# Patient Record
Sex: FEMALE | Race: WHITE | NOT HISPANIC OR LATINO | Employment: FULL TIME | URBAN - NONMETROPOLITAN AREA
[De-identification: names, ages, dates, MRNs, and addresses within clinical notes are randomized per-mention and may not be internally consistent; named-entity substitution may affect disease eponyms.]

---

## 2022-07-29 ENCOUNTER — HOSPITAL ENCOUNTER (INPATIENT)
Facility: HOSPITAL | Age: 22
LOS: 4 days | Discharge: HOME | End: 2022-08-02
Attending: PSYCHIATRY & NEUROLOGY | Admitting: PSYCHIATRY & NEUROLOGY

## 2022-07-29 ENCOUNTER — HOSPITAL ENCOUNTER (EMERGENCY)
Facility: HOSPITAL | Age: 22
Discharge: STILL ADMITTED OR ADMINISTRATIVE | End: 2022-07-29
Attending: EMERGENCY MEDICINE

## 2022-07-29 VITALS
DIASTOLIC BLOOD PRESSURE: 82 MMHG | HEART RATE: 71 BPM | SYSTOLIC BLOOD PRESSURE: 120 MMHG | TEMPERATURE: 98.2 F | WEIGHT: 170 LBS | OXYGEN SATURATION: 96 % | BODY MASS INDEX: 26.68 KG/M2 | HEIGHT: 67 IN | RESPIRATION RATE: 17 BRPM

## 2022-07-29 DIAGNOSIS — F32.A DEPRESSION WITH SUICIDAL IDEATION: Primary | ICD-10-CM

## 2022-07-29 DIAGNOSIS — R45.851 DEPRESSION WITH SUICIDAL IDEATION: Primary | ICD-10-CM

## 2022-07-29 LAB
ALBUMIN SERPL-MCNC: 5.11 G/DL (ref 3.5–5.2)
ALBUMIN/GLOB SERPL: 2.3 G/DL
ALP SERPL-CCNC: 74 U/L (ref 39–117)
ALT SERPL W P-5'-P-CCNC: 19 U/L (ref 1–33)
AMPHET+METHAMPHET UR QL: NEGATIVE
AMPHETAMINES UR QL: NEGATIVE
ANION GAP SERPL CALCULATED.3IONS-SCNC: 14.8 MMOL/L (ref 5–15)
AST SERPL-CCNC: 12 U/L (ref 1–32)
B-HCG UR QL: NEGATIVE
BACTERIA UR QL AUTO: ABNORMAL /HPF
BARBITURATES UR QL SCN: NEGATIVE
BASOPHILS # BLD AUTO: 0.06 10*3/MM3 (ref 0–0.2)
BASOPHILS NFR BLD AUTO: 0.7 % (ref 0–1.5)
BENZODIAZ UR QL SCN: POSITIVE
BILIRUB SERPL-MCNC: 0.4 MG/DL (ref 0–1.2)
BILIRUB UR QL STRIP: NEGATIVE
BUN SERPL-MCNC: 6 MG/DL (ref 6–20)
BUN/CREAT SERPL: 6.7 (ref 7–25)
BUPRENORPHINE SERPL-MCNC: NEGATIVE NG/ML
CALCIUM SPEC-SCNC: 10 MG/DL (ref 8.6–10.5)
CANNABINOIDS SERPL QL: NEGATIVE
CHLORIDE SERPL-SCNC: 106 MMOL/L (ref 98–107)
CLARITY UR: ABNORMAL
CO2 SERPL-SCNC: 22.2 MMOL/L (ref 22–29)
COCAINE UR QL: NEGATIVE
COLOR UR: ABNORMAL
CREAT SERPL-MCNC: 0.9 MG/DL (ref 0.57–1)
DEPRECATED RDW RBC AUTO: 43.2 FL (ref 37–54)
EGFRCR SERPLBLD CKD-EPI 2021: 91.7 ML/MIN/1.73
EOSINOPHIL # BLD AUTO: 0.08 10*3/MM3 (ref 0–0.4)
EOSINOPHIL NFR BLD AUTO: 0.9 % (ref 0.3–6.2)
ERYTHROCYTE [DISTWIDTH] IN BLOOD BY AUTOMATED COUNT: 13.7 % (ref 12.3–15.4)
ETHANOL BLD-MCNC: <10 MG/DL (ref 0–10)
ETHANOL UR QL: <0.01 %
FLUAV SUBTYP SPEC NAA+PROBE: NOT DETECTED
FLUBV RNA ISLT QL NAA+PROBE: NOT DETECTED
GLOBULIN UR ELPH-MCNC: 2.2 GM/DL
GLUCOSE SERPL-MCNC: 116 MG/DL (ref 65–99)
GLUCOSE UR STRIP-MCNC: NEGATIVE MG/DL
HCT VFR BLD AUTO: 40.8 % (ref 34–46.6)
HGB BLD-MCNC: 13.6 G/DL (ref 12–15.9)
HGB UR QL STRIP.AUTO: ABNORMAL
HOLD SPECIMEN: NORMAL
HOLD SPECIMEN: NORMAL
HYALINE CASTS UR QL AUTO: ABNORMAL /LPF
IMM GRANULOCYTES # BLD AUTO: 0.03 10*3/MM3 (ref 0–0.05)
IMM GRANULOCYTES NFR BLD AUTO: 0.3 % (ref 0–0.5)
KETONES UR QL STRIP: ABNORMAL
LEUKOCYTE ESTERASE UR QL STRIP.AUTO: ABNORMAL
LYMPHOCYTES # BLD AUTO: 1.76 10*3/MM3 (ref 0.7–3.1)
LYMPHOCYTES NFR BLD AUTO: 20.5 % (ref 19.6–45.3)
MAGNESIUM SERPL-MCNC: 2.1 MG/DL (ref 1.6–2.6)
MCH RBC QN AUTO: 28.7 PG (ref 26.6–33)
MCHC RBC AUTO-ENTMCNC: 33.3 G/DL (ref 31.5–35.7)
MCV RBC AUTO: 86.1 FL (ref 79–97)
METHADONE UR QL SCN: NEGATIVE
MONOCYTES # BLD AUTO: 0.29 10*3/MM3 (ref 0.1–0.9)
MONOCYTES NFR BLD AUTO: 3.4 % (ref 5–12)
NEUTROPHILS NFR BLD AUTO: 6.37 10*3/MM3 (ref 1.7–7)
NEUTROPHILS NFR BLD AUTO: 74.2 % (ref 42.7–76)
NITRITE UR QL STRIP: NEGATIVE
NRBC BLD AUTO-RTO: 0 /100 WBC (ref 0–0.2)
OPIATES UR QL: NEGATIVE
OXYCODONE UR QL SCN: NEGATIVE
PCP UR QL SCN: NEGATIVE
PH UR STRIP.AUTO: 6 [PH] (ref 5–8)
PLATELET # BLD AUTO: 255 10*3/MM3 (ref 140–450)
PMV BLD AUTO: 9.7 FL (ref 6–12)
POTASSIUM SERPL-SCNC: 3.6 MMOL/L (ref 3.5–5.2)
PROPOXYPH UR QL: NEGATIVE
PROT SERPL-MCNC: 7.3 G/DL (ref 6–8.5)
PROT UR QL STRIP: ABNORMAL
RBC # BLD AUTO: 4.74 10*6/MM3 (ref 3.77–5.28)
RBC # UR STRIP: ABNORMAL /HPF
REF LAB TEST METHOD: ABNORMAL
SARS-COV-2 RNA PNL SPEC NAA+PROBE: NOT DETECTED
SODIUM SERPL-SCNC: 143 MMOL/L (ref 136–145)
SP GR UR STRIP: 1.02 (ref 1–1.03)
SQUAMOUS #/AREA URNS HPF: ABNORMAL /HPF
TRICYCLICS UR QL SCN: NEGATIVE
UROBILINOGEN UR QL STRIP: ABNORMAL
WBC # UR STRIP: ABNORMAL /HPF
WBC NRBC COR # BLD: 8.59 10*3/MM3 (ref 3.4–10.8)
WHOLE BLOOD HOLD COAG: NORMAL
WHOLE BLOOD HOLD SPECIMEN: NORMAL

## 2022-07-29 PROCEDURE — 87636 SARSCOV2 & INF A&B AMP PRB: CPT | Performed by: PHYSICIAN ASSISTANT

## 2022-07-29 PROCEDURE — 80053 COMPREHEN METABOLIC PANEL: CPT | Performed by: PHYSICIAN ASSISTANT

## 2022-07-29 PROCEDURE — 99285 EMERGENCY DEPT VISIT HI MDM: CPT

## 2022-07-29 PROCEDURE — 93005 ELECTROCARDIOGRAM TRACING: CPT | Performed by: PSYCHIATRY & NEUROLOGY

## 2022-07-29 PROCEDURE — 83735 ASSAY OF MAGNESIUM: CPT | Performed by: PHYSICIAN ASSISTANT

## 2022-07-29 PROCEDURE — 82077 ASSAY SPEC XCP UR&BREATH IA: CPT | Performed by: PHYSICIAN ASSISTANT

## 2022-07-29 PROCEDURE — 80306 DRUG TEST PRSMV INSTRMNT: CPT | Performed by: PHYSICIAN ASSISTANT

## 2022-07-29 PROCEDURE — 81025 URINE PREGNANCY TEST: CPT | Performed by: PHYSICIAN ASSISTANT

## 2022-07-29 PROCEDURE — 85025 COMPLETE CBC W/AUTO DIFF WBC: CPT | Performed by: PHYSICIAN ASSISTANT

## 2022-07-29 PROCEDURE — 81001 URINALYSIS AUTO W/SCOPE: CPT | Performed by: PHYSICIAN ASSISTANT

## 2022-07-29 RX ORDER — ECHINACEA PURPUREA EXTRACT 125 MG
2 TABLET ORAL AS NEEDED
Status: DISCONTINUED | OUTPATIENT
Start: 2022-07-29 | End: 2022-08-02 | Stop reason: HOSPADM

## 2022-07-29 RX ORDER — LOPERAMIDE HYDROCHLORIDE 2 MG/1
2 CAPSULE ORAL
Status: DISCONTINUED | OUTPATIENT
Start: 2022-07-29 | End: 2022-08-02 | Stop reason: HOSPADM

## 2022-07-29 RX ORDER — BENZONATATE 100 MG/1
100 CAPSULE ORAL 3 TIMES DAILY PRN
Status: DISCONTINUED | OUTPATIENT
Start: 2022-07-29 | End: 2022-08-02 | Stop reason: HOSPADM

## 2022-07-29 RX ORDER — ARIPIPRAZOLE 5 MG/1
5 TABLET ORAL DAILY
COMMUNITY
End: 2022-08-02 | Stop reason: HOSPADM

## 2022-07-29 RX ORDER — ALUMINA, MAGNESIA, AND SIMETHICONE 2400; 2400; 240 MG/30ML; MG/30ML; MG/30ML
15 SUSPENSION ORAL EVERY 6 HOURS PRN
Status: DISCONTINUED | OUTPATIENT
Start: 2022-07-29 | End: 2022-08-02 | Stop reason: HOSPADM

## 2022-07-29 RX ORDER — HYDROXYZINE 50 MG/1
50 TABLET, FILM COATED ORAL EVERY 6 HOURS PRN
Status: DISCONTINUED | OUTPATIENT
Start: 2022-07-29 | End: 2022-08-02 | Stop reason: HOSPADM

## 2022-07-29 RX ORDER — TRAZODONE HYDROCHLORIDE 50 MG/1
50 TABLET ORAL NIGHTLY PRN
Status: DISCONTINUED | OUTPATIENT
Start: 2022-07-29 | End: 2022-08-02 | Stop reason: HOSPADM

## 2022-07-29 RX ORDER — BENZTROPINE MESYLATE 1 MG/ML
1 INJECTION INTRAMUSCULAR; INTRAVENOUS ONCE AS NEEDED
Status: DISCONTINUED | OUTPATIENT
Start: 2022-07-29 | End: 2022-08-02 | Stop reason: HOSPADM

## 2022-07-29 RX ORDER — FAMOTIDINE 20 MG/1
20 TABLET, FILM COATED ORAL 2 TIMES DAILY PRN
Status: DISCONTINUED | OUTPATIENT
Start: 2022-07-29 | End: 2022-08-02 | Stop reason: HOSPADM

## 2022-07-29 RX ORDER — CETIRIZINE HYDROCHLORIDE 10 MG/1
10 TABLET ORAL DAILY
COMMUNITY

## 2022-07-29 RX ORDER — BENZTROPINE MESYLATE 1 MG/1
2 TABLET ORAL ONCE AS NEEDED
Status: DISCONTINUED | OUTPATIENT
Start: 2022-07-29 | End: 2022-08-02 | Stop reason: HOSPADM

## 2022-07-29 RX ORDER — LORAZEPAM 0.5 MG/1
0.5 TABLET ORAL 2 TIMES DAILY
Status: CANCELLED | OUTPATIENT
Start: 2022-07-29

## 2022-07-29 RX ORDER — LORAZEPAM 0.5 MG/1
0.5 TABLET ORAL 2 TIMES DAILY
Status: DISCONTINUED | OUTPATIENT
Start: 2022-07-29 | End: 2022-07-31

## 2022-07-29 RX ORDER — ARIPIPRAZOLE 10 MG/1
5 TABLET ORAL DAILY
Status: DISCONTINUED | OUTPATIENT
Start: 2022-07-30 | End: 2022-07-30

## 2022-07-29 RX ORDER — ONDANSETRON 4 MG/1
4 TABLET, FILM COATED ORAL EVERY 6 HOURS PRN
Status: DISCONTINUED | OUTPATIENT
Start: 2022-07-29 | End: 2022-08-02 | Stop reason: HOSPADM

## 2022-07-29 RX ORDER — IBUPROFEN 400 MG/1
400 TABLET ORAL EVERY 6 HOURS PRN
Status: DISCONTINUED | OUTPATIENT
Start: 2022-07-29 | End: 2022-08-02 | Stop reason: HOSPADM

## 2022-07-29 RX ORDER — ACETAMINOPHEN 325 MG/1
650 TABLET ORAL EVERY 6 HOURS PRN
Status: DISCONTINUED | OUTPATIENT
Start: 2022-07-29 | End: 2022-08-02 | Stop reason: HOSPADM

## 2022-07-29 RX ORDER — CETIRIZINE HYDROCHLORIDE 10 MG/1
10 TABLET ORAL DAILY
Status: DISCONTINUED | OUTPATIENT
Start: 2022-07-30 | End: 2022-08-02 | Stop reason: HOSPADM

## 2022-07-29 RX ORDER — OLANZAPINE 5 MG/1
5 TABLET ORAL EVERY 6 HOURS PRN
Status: DISCONTINUED | OUTPATIENT
Start: 2022-07-29 | End: 2022-08-02 | Stop reason: HOSPADM

## 2022-07-29 RX ORDER — LORAZEPAM 0.5 MG/1
0.5 TABLET ORAL 2 TIMES DAILY
COMMUNITY
End: 2022-08-02 | Stop reason: HOSPADM

## 2022-07-29 RX ADMIN — ACETAMINOPHEN 650 MG: 325 TABLET ORAL at 21:42

## 2022-07-29 RX ADMIN — LORAZEPAM 0.5 MG: 0.5 TABLET ORAL at 21:42

## 2022-07-29 RX ADMIN — TRAZODONE HYDROCHLORIDE 50 MG: 50 TABLET ORAL at 22:54

## 2022-07-30 PROBLEM — F33.2 SEVERE EPISODE OF RECURRENT MAJOR DEPRESSIVE DISORDER, WITHOUT PSYCHOTIC FEATURES: Status: ACTIVE | Noted: 2022-07-30

## 2022-07-30 LAB
QT INTERVAL: 362 MS
QTC INTERVAL: 442 MS

## 2022-07-30 PROCEDURE — 99221 1ST HOSP IP/OBS SF/LOW 40: CPT | Performed by: PSYCHIATRY & NEUROLOGY

## 2022-07-30 RX ORDER — RISPERIDONE 1 MG/1
1 TABLET ORAL EVERY 12 HOURS SCHEDULED
Status: DISCONTINUED | OUTPATIENT
Start: 2022-07-30 | End: 2022-07-31

## 2022-07-30 RX ORDER — RISPERIDONE 0.25 MG/1
0.5 TABLET ORAL EVERY 12 HOURS SCHEDULED
Status: DISCONTINUED | OUTPATIENT
Start: 2022-07-30 | End: 2022-07-30

## 2022-07-30 RX ADMIN — OLANZAPINE 5 MG: 5 TABLET, FILM COATED ORAL at 08:09

## 2022-07-30 RX ADMIN — LORAZEPAM 0.5 MG: 0.5 TABLET ORAL at 08:07

## 2022-07-30 RX ADMIN — TRAZODONE HYDROCHLORIDE 50 MG: 50 TABLET ORAL at 21:13

## 2022-07-30 RX ADMIN — ACETAMINOPHEN 650 MG: 325 TABLET ORAL at 21:53

## 2022-07-30 RX ADMIN — CETIRIZINE HYDROCHLORIDE 10 MG: 10 TABLET, FILM COATED ORAL at 08:07

## 2022-07-30 RX ADMIN — HYDROXYZINE HYDROCHLORIDE 50 MG: 50 TABLET ORAL at 02:18

## 2022-07-30 RX ADMIN — OLANZAPINE 5 MG: 5 TABLET, FILM COATED ORAL at 13:49

## 2022-07-30 RX ADMIN — LORAZEPAM 0.5 MG: 0.5 TABLET ORAL at 20:25

## 2022-07-30 RX ADMIN — RISPERIDONE 0.5 MG: 0.25 TABLET, FILM COATED ORAL at 11:14

## 2022-07-30 RX ADMIN — RISPERIDONE 1 MG: 1 TABLET, FILM COATED ORAL at 20:25

## 2022-07-31 PROCEDURE — 99231 SBSQ HOSP IP/OBS SF/LOW 25: CPT | Performed by: PSYCHIATRY & NEUROLOGY

## 2022-07-31 RX ORDER — RISPERIDONE 1 MG/1
2 TABLET ORAL EVERY 12 HOURS SCHEDULED
Status: DISCONTINUED | OUTPATIENT
Start: 2022-07-31 | End: 2022-08-02 | Stop reason: HOSPADM

## 2022-07-31 RX ORDER — LORAZEPAM 0.5 MG/1
0.5 TABLET ORAL NIGHTLY
Status: DISCONTINUED | OUTPATIENT
Start: 2022-07-31 | End: 2022-08-01

## 2022-07-31 RX ADMIN — RISPERIDONE 1 MG: 1 TABLET, FILM COATED ORAL at 08:20

## 2022-07-31 RX ADMIN — RISPERIDONE 2 MG: 1 TABLET, FILM COATED ORAL at 20:32

## 2022-07-31 RX ADMIN — CETIRIZINE HYDROCHLORIDE 10 MG: 10 TABLET, FILM COATED ORAL at 08:20

## 2022-07-31 RX ADMIN — OLANZAPINE 5 MG: 5 TABLET, FILM COATED ORAL at 14:35

## 2022-07-31 RX ADMIN — LORAZEPAM 0.5 MG: 0.5 TABLET ORAL at 20:32

## 2022-07-31 RX ADMIN — HYDROXYZINE HYDROCHLORIDE 50 MG: 50 TABLET ORAL at 10:19

## 2022-07-31 RX ADMIN — ACETAMINOPHEN 650 MG: 325 TABLET ORAL at 21:23

## 2022-07-31 RX ADMIN — TRAZODONE HYDROCHLORIDE 50 MG: 50 TABLET ORAL at 20:32

## 2022-07-31 RX ADMIN — LORAZEPAM 0.5 MG: 0.5 TABLET ORAL at 08:19

## 2022-08-01 PROCEDURE — 99232 SBSQ HOSP IP/OBS MODERATE 35: CPT | Performed by: PSYCHIATRY & NEUROLOGY

## 2022-08-01 RX ORDER — ESCITALOPRAM OXALATE 10 MG/1
10 TABLET ORAL DAILY
Status: DISCONTINUED | OUTPATIENT
Start: 2022-08-01 | End: 2022-08-02 | Stop reason: HOSPADM

## 2022-08-01 RX ADMIN — HYDROXYZINE HYDROCHLORIDE 50 MG: 50 TABLET ORAL at 08:22

## 2022-08-01 RX ADMIN — CETIRIZINE HYDROCHLORIDE 10 MG: 10 TABLET, FILM COATED ORAL at 08:20

## 2022-08-01 RX ADMIN — ESCITALOPRAM 10 MG: 10 TABLET, FILM COATED ORAL at 17:11

## 2022-08-01 RX ADMIN — HYDROXYZINE HYDROCHLORIDE 50 MG: 50 TABLET ORAL at 20:24

## 2022-08-01 RX ADMIN — OLANZAPINE 5 MG: 5 TABLET, FILM COATED ORAL at 12:36

## 2022-08-01 RX ADMIN — RISPERIDONE 2 MG: 1 TABLET, FILM COATED ORAL at 09:25

## 2022-08-01 RX ADMIN — RISPERIDONE 2 MG: 1 TABLET, FILM COATED ORAL at 20:24

## 2022-08-01 RX ADMIN — HYDROXYZINE HYDROCHLORIDE 50 MG: 50 TABLET ORAL at 14:21

## 2022-08-01 RX ADMIN — TRAZODONE HYDROCHLORIDE 50 MG: 50 TABLET ORAL at 20:59
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
## 2022-08-01 NOTE — PLAN OF CARE
DATA:      Therapist discussed case with RN and met with patient today to review coping skills, review plan of care, and discuss discharge.    Therapist provided patient with psychoeducation and reading materials on coping skills and grounding techniques for anxiety. Therapist provided patient with journal paper and encouraged her to journal.      Clinical Maneuvering/Intervention:     Therapist assisted patient in processing above session content; acknowledged and normalized patient’s thoughts, feelings, and concerns.  Discussed the therapist/patient relationship and explain the parameters and limitations of relative confidentiality.  Also discussed the importance of active participation, and honesty to the treatment process.  Encouraged the patient to discuss/vent their feelings, frustrations, and fears concerning their ongoing medical issues and validated their feelings.     Allowed patient to freely discuss issues without interruption or judgment. Provided safe, confidential environment to facilitate the development of positive therapeutic relationship and encourage open, honest communication.      Therapist addressed discharge safety planning this date. Assisted patient in identifying risk factors which would indicate the need for higher level of care after discharge;  including thoughts to harm self or others and/or self-harming behavior. Encouraged patient to call 911, or present to the nearest emergency room should any of these events occur. Discussed crisis intervention services and means to access.  Encouraged securing any objects of harm.    Therapist completed integrated summary, treatment plan, and initiated social history this date.  Therapist is strongly encouraging family involvement in treatment.       Encouraged mask wearing, social distancing, and regular hand washing due to COVID19 risk.      ASSESSMENT:      Therapist met 1:1 with patient today. Patient denies suicidal ideation. Patient  "denies homicidal ideation. Patient denies AVH. Patient is cooperative with the assessment. She reports ongoing anxiety. Patient appears labile while meeting with this therapist; however, appears calm when interacting with peers. Patient is focused on medication, reports ativan is the only think that has helped her. Patient reports she has taken 5-6 different psychotropic medications over the past month and none have been helpful. Therapist educated patient on the importance of giving medications an appropriate amount of time to reach maximum effect. Therapist encouraged medication compliance.    Patient is unable to identify specific stressors. Reports she took a CBD gummy 1 month ago and \"everything has been going down hill since.\" Patient reports constant anxiety and frequent panic attacks. Unable to engage in identifying irrational thoughts, states \"I just worry about my kids.\" Patient has a negative attitude toward behavioral interventions for anxiety, stating \"It doesn't work. Even reading a paper that says 'anxiety' on it makes me anxious.\"    There is some concern regarding aftercare plans. Patient appears to want to see multiple providers at different agencies. She reports that she has an upcoming appointment at [facility] for medication management and therapy, an appointment at [facility] for therapy, and is requesting an additional appointment with MARY Tam for medication management.      PLAN:       Patient to remain hospitalized this date.      Treatment team will focus efforts on stabilizing patient's acute symptoms while providing education on healthy coping and crisis management to reduce hospitalizations.   Patient requires daily psychiatrist evaluation and 24/7 nursing supervision to promote patient  safety.     Therapist will offer 1-4 individual sessions, 1 therapy group daily, family education, and appropriate referral.    "

## 2022-08-01 NOTE — PLAN OF CARE
Goal Outcome Evaluation:  Plan of Care Reviewed With: patient  Patient Agreement with Plan of Care: agrees     Progress: improving  Outcome Evaluation: pt. verbalzies slept 6 hours or more rates anxiety 5 rates depression 5 denies s/i denies h/i denies a//v/h pt. tearful @ times verbalzies can't find a medication to helped discussed coping techniques to talk about feelings ,write in jounal try to think posittive thoughts she verbalzies just worries about family interaction noted with peers .

## 2022-08-01 NOTE — PLAN OF CARE
Goal Outcome Evaluation:  Plan of Care Reviewed With: patient  Patient Agreement with Plan of Care: agrees        Outcome Evaluation: Patient reports anxiety at 3 and depression at 2.  Denies SI/HI or AVH.  Patient frequently seeks out staff.  Patient cooperative.

## 2022-08-02 VITALS
BODY MASS INDEX: 26.18 KG/M2 | DIASTOLIC BLOOD PRESSURE: 99 MMHG | RESPIRATION RATE: 18 BRPM | OXYGEN SATURATION: 98 % | TEMPERATURE: 97.6 F | WEIGHT: 166.8 LBS | HEIGHT: 67 IN | HEART RATE: 102 BPM | SYSTOLIC BLOOD PRESSURE: 163 MMHG

## 2022-08-02 PROCEDURE — 99238 HOSP IP/OBS DSCHRG MGMT 30/<: CPT | Performed by: PSYCHIATRY & NEUROLOGY

## 2022-08-02 RX ORDER — ESCITALOPRAM OXALATE 10 MG/1
10 TABLET ORAL DAILY
Qty: 30 TABLET | Refills: 0 | Status: SHIPPED | OUTPATIENT
Start: 2022-08-03 | End: 2022-08-09 | Stop reason: HOSPADM

## 2022-08-02 RX ORDER — RISPERIDONE 2 MG/1
2 TABLET ORAL EVERY 12 HOURS SCHEDULED
Qty: 60 TABLET | Refills: 0 | Status: SHIPPED | OUTPATIENT
Start: 2022-08-02 | End: 2022-08-02 | Stop reason: SDUPTHER

## 2022-08-02 RX ORDER — RISPERIDONE 1 MG/1
1 TABLET ORAL 2 TIMES DAILY
Qty: 60 TABLET | Refills: 0 | Status: SHIPPED | OUTPATIENT
Start: 2022-08-02 | End: 2022-08-09 | Stop reason: HOSPADM

## 2022-08-02 RX ADMIN — HYDROXYZINE HYDROCHLORIDE 50 MG: 50 TABLET ORAL at 10:43

## 2022-08-02 RX ADMIN — RISPERIDONE 2 MG: 1 TABLET, FILM COATED ORAL at 08:14

## 2022-08-02 RX ADMIN — IBUPROFEN 400 MG: 400 TABLET, FILM COATED ORAL at 08:14

## 2022-08-02 RX ADMIN — CETIRIZINE HYDROCHLORIDE 10 MG: 10 TABLET, FILM COATED ORAL at 08:14

## 2022-08-02 RX ADMIN — ESCITALOPRAM 10 MG: 10 TABLET, FILM COATED ORAL at 08:14

## 2022-08-02 NOTE — PLAN OF CARE
Goal Outcome Evaluation:  Plan of Care Reviewed With: patient  Patient Agreement with Plan of Care: agrees        Pt states anxiety 5, depression 5. She is calm and cooperative with staff, med compliant.

## 2022-08-02 NOTE — DISCHARGE INSTR - APPOINTMENTS
[facility]   [address] [facility]  [phone]    August 5, 2022 at 10:00am with Ruma Garland      [address]   [facility]  [phone]    August 4 at 11:00am with Rawada Omar, APRN

## 2022-08-02 NOTE — DISCHARGE SUMMARY
":  2000  MRN:  [id number]  Visit Number:  [id number]      Date of Admission:2022   Date of Discharge:  2022    Discharge Diagnosis:  Principal Problem:    Severe episode of recurrent major depressive disorder, without psychotic features ([facility])  Active Problems:    Suicidal ideation        Admission Diagnosis:  Suicidal ideation [R45.851]     PAM Arizmendi is a 22 y.o. female who was admitted on 2022 with complaints of suicidal ideation.   For details please see H&P dated 22.    Hospital Course  Patient is a 22 y.o. female presented with depression and suicidal ideations. The patient was admitted to the [facility] AE[facility] unit for safety, further evaluation and treatment.  The patient reported she started feeling suicidal after she started taking Abilify about ten days prior to coming to the hospital. Abilify was not resumed, she was started on Lexapro and Risperdal and patient reported feeling better and wanted to continue taking them though she wanted to lower the dose of Risperdal.   The patient was also able to take part in individual and group counseling sessions and work on appropriate coping skills.  The patient made steady improvement in her mood and expressed feeling more positive and hopeful about future. Sleep and appetite were improved.  The day of discharge the patient was calm, cooperative and pleasant. Mood was reported to be good, and denied SI/HI/AVH. Also reported no medication side effects.  .      Mental Status Exam upon discharge:   Mood \"good\"   Affect-congruent, appropriate, stable  Thought Content-goal directed, no delusional material present  Thought process-linear, organized.  Suicidality: No SI  Homicidality: No HI  Perception: No AH/    Procedures Performed         Consults:   Consults     No orders found from 2022 to 2022.          Pertinent Test Results:   Admission on 2022   Component Date Value Ref Range Status   • QT Interval " 07/29/2022 362  ms Final   • QTC Interval 07/29/2022 442  ms Final   Admission on 07/29/2022, Discharged on 07/29/2022   Component Date Value Ref Range Status   • Glucose 07/29/2022 116 (A) 65 - 99 mg/dL Final   • BUN 07/29/2022 6  6 - 20 mg/dL Final   • Creatinine 07/29/2022 0.90  0.57 - 1.00 mg/dL Final   • Sodium 07/29/2022 143  136 - 145 mmol/L Final   • Potassium 07/29/2022 3.6  3.5 - 5.2 mmol/L Final   • Chloride 07/29/2022 106  98 - 107 mmol/L Final   • CO2 07/29/2022 22.2  22.0 - 29.0 mmol/L Final   • Calcium 07/29/2022 10.0  8.6 - 10.5 mg/dL Final   • Total Protein 07/29/2022 7.3  6.0 - 8.5 g/dL Final   • Albumin 07/29/2022 5.11  3.50 - 5.20 g/dL Final   • ALT (SGPT) 07/29/2022 19  1 - 33 U/L Final   • AST (SGOT) 07/29/2022 12  1 - 32 U/L Final   • Alkaline Phosphatase 07/29/2022 74  39 - 117 U/L Final   • Total Bilirubin 07/29/2022 0.4  0.0 - 1.2 mg/dL Final   • Globulin 07/29/2022 2.2  gm/dL Final   • A/G Ratio 07/29/2022 2.3  g/dL Final   • BUN/Creatinine Ratio 07/29/2022 6.7 (A) 7.0 - 25.0 Final   • Anion Gap 07/29/2022 14.8  5.0 - 15.0 mmol/L Final   • eGFR 07/29/2022 91.7  >60.0 mL/min/1.73 Final    National Kidney Foundation and American Society of Nephrology (ASN) Task Force recommended calculation based on the Chronic Kidney Disease Epidemiology Collaboration (CKD-EPI) equation refit without adjustment for race.   • HCG, Urine QL 07/29/2022 Negative  Negative Final   • Color, UA 07/29/2022 Dark Yellow (A) Yellow, Straw Final   • Appearance, UA 07/29/2022 Cloudy (A) Clear Final   • pH, UA 07/29/2022 6.0  5.0 - 8.0 Final   • Specific Gravity, UA 07/29/2022 1.023  1.005 - 1.030 Final   • Glucose, UA 07/29/2022 Negative  Negative Final   • Ketones, UA 07/29/2022 Trace (A) Negative Final   • Bilirubin, UA 07/29/2022 Negative  Negative Final   • Blood, UA 07/29/2022 Large (3+) (A) Negative Final   • Protein, UA 07/29/2022 Trace (A) Negative Final   • Leuk Esterase, UA 07/29/2022 Small (1+) (A) Negative  Final   • Nitrite, UA 07/29/2022 Negative  Negative Final   • Urobilinogen, UA 07/29/2022 1.0 E.U./dL  0.2 - 1.0 E.U./dL Final   • Ethanol 07/29/2022 <10  0 - 10 mg/dL Final   • Ethanol % 07/29/2022 <0.010  % Final   • THC, Screen, Urine 07/29/2022 Negative  Negative Final   • Phencyclidine (PCP), Urine 07/29/2022 Negative  Negative Final   • Cocaine Screen, Urine 07/29/2022 Negative  Negative Final   • Methamphetamine, Ur 07/29/2022 Negative  Negative Final   • Opiate Screen 07/29/2022 Negative  Negative Final   • Amphetamine Screen, Urine 07/29/2022 Negative  Negative Final   • Benzodiazepine Screen, Urine 07/29/2022 Positive (A) Negative Final   • Tricyclic Antidepressants Screen 07/29/2022 Negative  Negative Final   • Methadone Screen, Urine 07/29/2022 Negative  Negative Final   • Barbiturates Screen, Urine 07/29/2022 Negative  Negative Final   • Oxycodone Screen, Urine 07/29/2022 Negative  Negative Final   • Propoxyphene Screen 07/29/2022 Negative  Negative Final   • Buprenorphine, Screen, Urine 07/29/2022 Negative  Negative Final   • Magnesium 07/29/2022 2.1  1.6 - 2.6 mg/dL Final   • COVID19 07/29/2022 Not Detected  Not Detected - Ref. Range Final   • Influenza A PCR 07/29/2022 Not Detected  Not Detected Final   • Influenza B PCR 07/29/2022 Not Detected  Not Detected Final   • WBC 07/29/2022 8.59  3.40 - 10.80 10*3/mm3 Final   • RBC 07/29/2022 4.74  3.77 - 5.28 10*6/mm3 Final   • Hemoglobin 07/29/2022 13.6  12.0 - 15.9 g/dL Final   • Hematocrit 07/29/2022 40.8  34.0 - 46.6 % Final   • MCV 07/29/2022 86.1  79.0 - 97.0 fL Final   • MCH 07/29/2022 28.7  26.6 - 33.0 pg Final   • MCHC 07/29/2022 33.3  31.5 - 35.7 g/dL Final   • RDW 07/29/2022 13.7  12.3 - 15.4 % Final   • RDW-SD 07/29/2022 43.2  37.0 - 54.0 fl Final   • MPV 07/29/2022 9.7  6.0 - 12.0 fL Final   • Platelets 07/29/2022 255  140 - 450 10*3/mm3 Final   • Neutrophil % 07/29/2022 74.2  42.7 - 76.0 % Final   • Lymphocyte % 07/29/2022 20.5  19.6 - 45.3 %  Final   • Monocyte % 07/29/2022 3.4 (A) 5.0 - 12.0 % Final   • Eosinophil % 07/29/2022 0.9  0.3 - 6.2 % Final   • Basophil % 07/29/2022 0.7  0.0 - 1.5 % Final   • Immature Grans % 07/29/2022 0.3  0.0 - 0.5 % Final   • Neutrophils, Absolute 07/29/2022 6.37  1.70 - 7.00 10*3/mm3 Final   • Lymphocytes, Absolute 07/29/2022 1.76  0.70 - 3.10 10*3/mm3 Final   • Monocytes, Absolute 07/29/2022 0.29  0.10 - 0.90 10*3/mm3 Final   • Eosinophils, Absolute 07/29/2022 0.08  0.00 - 0.40 10*3/mm3 Final   • Basophils, Absolute 07/29/2022 0.06  0.00 - 0.20 10*3/mm3 Final   • Immature Grans, Absolute 07/29/2022 0.03  0.00 - 0.05 10*3/mm3 Final   • nRBC 07/29/2022 0.0  0.0 - 0.2 /100 WBC Final   • Extra Tube 07/29/2022 Hold for add-ons.   Final    Auto resulted.   • Extra Tube 07/29/2022 hold for add-on   Final    Auto resulted   • Extra Tube 07/29/2022 Hold for add-ons.   Final    Auto resulted.   • Extra Tube 07/29/2022 Hold for add-ons.   Final    Auto resulted   • RBC, UA 07/29/2022 Too Numerous to Count (A) None Seen, 0-2 /HPF Final   • WBC, UA 07/29/2022 13-20 (A) None Seen, 0-2 /HPF Final   • Bacteria, UA 07/29/2022 1+ (A) None Seen /HPF Final   • Squamous Epithelial Cells, UA 07/29/2022 13-20 (A) None Seen, 0-2 /HPF Final   • Hyaline Casts, UA 07/29/2022 None Seen  None Seen /LPF Final   • Methodology 07/29/2022 Automated Microscopy   Final        Condition on Discharge:  improved    Vital Signs  Temp:  [97.1 °F (36.2 °C)-97.6 °F (36.4 °C)] 97.6 °F (36.4 °C)  Heart Rate:  [] 102  Resp:  [18] 18  BP: (124-163)/(81-99) 163/99      Discharge Disposition:  Home or Self Care    Discharge Medications:     Discharge Medications      New Medications      Instructions Start Date   escitalopram 10 MG tablet  Commonly known as: LEXAPRO   10 mg, Oral, Daily   Start Date: August 3, 2022     risperiDONE 1 MG tablet  Commonly known as: risperDAL   1 mg, Oral, 2 Times Daily         Continue These Medications      Instructions Start Date    cetirizine 10 MG tablet  Commonly known as: zyrTEC   10 mg, Oral, Daily         Stop These Medications    ARIPiprazole 5 MG tablet  Commonly known as: ABILIFY     LORazepam 0.5 MG tablet  Commonly known as: ATIVAN            Discharge Diet: Regular     Activity at Discharge: As tolerated     Follow-up Appointments  [facility]      Time spent in discharge: < 30 min    Clinician:   Sienna Paula MD  08/02/22  09:38 EDT
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
## 2022-08-02 NOTE — PLAN OF CARE
Goal Outcome Evaluation:  Plan of Care Reviewed With: patient  Patient Agreement with Plan of Care: agrees     Progress: improving  Outcome Evaluation: pt. verbalzies she toss & turn through out night rates anxiety 5 rates depression 3 denies s/i deneis h/i denies a/v/h she verbalzies feels paramoid pt. is discharged to day

## 2022-08-02 NOTE — PROGRESS NOTES
Behavioral Health Discharge Summary             Please fax within 24 hours of discharge to [facility] at: [phone]      Member Name: Tori Arizmendi Member ID: [id number]   Authorization Number: [id number] Phone: [phone]   Member Address: [address] [place], [place] [address]   Discharge Date: 08/02/2022 Level of Care at Discharge:    Facility: [facility] Staff Completing Form: Maureen ARAIZA RN U.R.   If the member is being discharged directly to a residential or extended care program, please specify the type below.   __Private Child-Caring Facility (PCC) Residential/Group Home   __Private Child-Caring Facility (PCC) Therapeutic Foster Care   __Residential Treatment Facility (RTF)   __Psychiatric Residential Treatment Facility (PRTF I or II)   __Long-Term Acute Inpatient Hospital Services or Extended Care Unit (ECU)   __Other (please specify):    Brief discharge summary of treatment received (for follow up by the case management team): D/C clinical with list of medications and follow up appts given to patient upon discharge.     BRIEF SUMMARY OF RECOMMENDATIONS FOR ONGOING TREATMENT     Discharged to where: Home   Discharge diagnoses: F 33.2   Axis I:    Axis II:    Axis III:    Axis IV:    Axis V:    Does the member understand his/her DX?  Yes          Medication     Dose     Schedule Supply/  Quantity  Given at Discharge RX Provided  Yes/No  If Rx Provided, Quantity RX Prior Auth Required  Yes/No Prior Auth  Completed   lexapro 10 mg  Daily         Risperidone  1 mg  2 X day                                                                               Does the member understand the reason for taking these medications? Yes                                                           FOLLOW-UP APPOINTMENTS   Please schedule within 7 days of discharge and provide appointment details for all referred services.    PCP/Other Providers Involved in Treatment:    Appointment  Type: OP   Provider Name:   [facility]   [facility]  Provider Phone:     [phone] Appointment Date:   08/04/2022 08/05/2022 Appointment Time:     11:00 AM Su    10:00 AIDAN Monge   (new to OP services)        Case Management    Is the member already enrolled in case management?  Yes/No  If yes, date the CM was notified:    If no, was the CM referral offered?  Yes/No  Accepted? Yes/No    Is the Release of Information in the chart? Yes/No:      Medication Management (for member discharged with psychiatric medications):      A&D Treatment (for member with substance abuse/   dependence in the past year):      Medical Condition (for member with a medical condition):    Other recommended treatment:    Do you have any concerns about the discharge plan?  No    If yes, explain:    Was the member involved in the discharge planning?  Yes    If no, explain:    Was a copy of the discharge plan provided to the member?  Yes    If no, explain:

## 2022-08-04 ENCOUNTER — HOSPITAL ENCOUNTER (INPATIENT)
Facility: HOSPITAL | Age: 22
LOS: 5 days | Discharge: HOME | End: 2022-08-09
Attending: STUDENT IN AN ORGANIZED HEALTH CARE EDUCATION/TRAINING PROGRAM | Admitting: STUDENT IN AN ORGANIZED HEALTH CARE EDUCATION/TRAINING PROGRAM

## 2022-08-04 ENCOUNTER — HOSPITAL ENCOUNTER (EMERGENCY)
Facility: HOSPITAL | Age: 22
Discharge: OTHER INSTITUTION | End: 2022-08-04
Attending: EMERGENCY MEDICINE

## 2022-08-04 VITALS
DIASTOLIC BLOOD PRESSURE: 94 MMHG | SYSTOLIC BLOOD PRESSURE: 131 MMHG | TEMPERATURE: 97.3 F | BODY MASS INDEX: 26.68 KG/M2 | OXYGEN SATURATION: 99 % | RESPIRATION RATE: 18 BRPM | HEIGHT: 67 IN | WEIGHT: 170 LBS | HEART RATE: 114 BPM

## 2022-08-04 DIAGNOSIS — F32.A DEPRESSION WITH SUICIDAL IDEATION: Primary | ICD-10-CM

## 2022-08-04 DIAGNOSIS — R45.851 DEPRESSION WITH SUICIDAL IDEATION: Primary | ICD-10-CM

## 2022-08-04 PROBLEM — F32.9 MDD (MAJOR DEPRESSIVE DISORDER): Status: ACTIVE | Noted: 2022-08-04

## 2022-08-04 LAB
ALBUMIN SERPL-MCNC: 5.5 G/DL (ref 3.5–5.2)
ALBUMIN/GLOB SERPL: 2.4 G/DL
ALP SERPL-CCNC: 84 U/L (ref 39–117)
ALT SERPL W P-5'-P-CCNC: 18 U/L (ref 1–33)
AMPHET+METHAMPHET UR QL: NEGATIVE
AMPHETAMINES UR QL: NEGATIVE
ANION GAP SERPL CALCULATED.3IONS-SCNC: 16.4 MMOL/L (ref 5–15)
AST SERPL-CCNC: 12 U/L (ref 1–32)
B-HCG UR QL: NEGATIVE
BACTERIA UR QL AUTO: ABNORMAL /HPF
BARBITURATES UR QL SCN: NEGATIVE
BASOPHILS # BLD AUTO: 0.05 10*3/MM3 (ref 0–0.2)
BASOPHILS NFR BLD AUTO: 0.7 % (ref 0–1.5)
BENZODIAZ UR QL SCN: NEGATIVE
BILIRUB SERPL-MCNC: 0.6 MG/DL (ref 0–1.2)
BILIRUB UR QL STRIP: NEGATIVE
BUN SERPL-MCNC: 8 MG/DL (ref 6–20)
BUN/CREAT SERPL: 10.1 (ref 7–25)
BUPRENORPHINE SERPL-MCNC: NEGATIVE NG/ML
CALCIUM SPEC-SCNC: 10.6 MG/DL (ref 8.6–10.5)
CANNABINOIDS SERPL QL: NEGATIVE
CHLORIDE SERPL-SCNC: 102 MMOL/L (ref 98–107)
CLARITY UR: CLEAR
CO2 SERPL-SCNC: 21.6 MMOL/L (ref 22–29)
COCAINE UR QL: NEGATIVE
COLOR UR: YELLOW
CREAT SERPL-MCNC: 0.79 MG/DL (ref 0.57–1)
DEPRECATED RDW RBC AUTO: 42.3 FL (ref 37–54)
EGFRCR SERPLBLD CKD-EPI 2021: 107.3 ML/MIN/1.73
EOSINOPHIL # BLD AUTO: 0.03 10*3/MM3 (ref 0–0.4)
EOSINOPHIL NFR BLD AUTO: 0.4 % (ref 0.3–6.2)
ERYTHROCYTE [DISTWIDTH] IN BLOOD BY AUTOMATED COUNT: 13.7 % (ref 12.3–15.4)
ETHANOL BLD-MCNC: <10 MG/DL (ref 0–10)
ETHANOL UR QL: <0.01 %
FLUAV SUBTYP SPEC NAA+PROBE: NOT DETECTED
FLUBV RNA ISLT QL NAA+PROBE: NOT DETECTED
GLOBULIN UR ELPH-MCNC: 2.3 GM/DL
GLUCOSE SERPL-MCNC: 110 MG/DL (ref 65–99)
GLUCOSE UR STRIP-MCNC: NEGATIVE MG/DL
HCT VFR BLD AUTO: 42.2 % (ref 34–46.6)
HGB BLD-MCNC: 14.2 G/DL (ref 12–15.9)
HGB UR QL STRIP.AUTO: NEGATIVE
HOLD SPECIMEN: NORMAL
HOLD SPECIMEN: NORMAL
HYALINE CASTS UR QL AUTO: ABNORMAL /LPF
IMM GRANULOCYTES # BLD AUTO: 0.01 10*3/MM3 (ref 0–0.05)
IMM GRANULOCYTES NFR BLD AUTO: 0.1 % (ref 0–0.5)
KETONES UR QL STRIP: ABNORMAL
LEUKOCYTE ESTERASE UR QL STRIP.AUTO: ABNORMAL
LYMPHOCYTES # BLD AUTO: 1.57 10*3/MM3 (ref 0.7–3.1)
LYMPHOCYTES NFR BLD AUTO: 22.4 % (ref 19.6–45.3)
MAGNESIUM SERPL-MCNC: 2 MG/DL (ref 1.6–2.6)
MCH RBC QN AUTO: 28.2 PG (ref 26.6–33)
MCHC RBC AUTO-ENTMCNC: 33.6 G/DL (ref 31.5–35.7)
MCV RBC AUTO: 83.9 FL (ref 79–97)
METHADONE UR QL SCN: NEGATIVE
MONOCYTES # BLD AUTO: 0.3 10*3/MM3 (ref 0.1–0.9)
MONOCYTES NFR BLD AUTO: 4.3 % (ref 5–12)
NEUTROPHILS NFR BLD AUTO: 5.05 10*3/MM3 (ref 1.7–7)
NEUTROPHILS NFR BLD AUTO: 72.1 % (ref 42.7–76)
NITRITE UR QL STRIP: NEGATIVE
NRBC BLD AUTO-RTO: 0 /100 WBC (ref 0–0.2)
OPIATES UR QL: NEGATIVE
OXYCODONE UR QL SCN: NEGATIVE
PCP UR QL SCN: NEGATIVE
PH UR STRIP.AUTO: 8 [PH] (ref 5–8)
PLATELET # BLD AUTO: 272 10*3/MM3 (ref 140–450)
PMV BLD AUTO: 9.6 FL (ref 6–12)
POTASSIUM SERPL-SCNC: 3.7 MMOL/L (ref 3.5–5.2)
PROPOXYPH UR QL: NEGATIVE
PROT SERPL-MCNC: 7.8 G/DL (ref 6–8.5)
PROT UR QL STRIP: NEGATIVE
QT INTERVAL: 364 MS
QTC INTERVAL: 464 MS
RBC # BLD AUTO: 5.03 10*6/MM3 (ref 3.77–5.28)
RBC # UR STRIP: ABNORMAL /HPF
REF LAB TEST METHOD: ABNORMAL
SARS-COV-2 RNA PNL SPEC NAA+PROBE: NOT DETECTED
SODIUM SERPL-SCNC: 140 MMOL/L (ref 136–145)
SP GR UR STRIP: 1.01 (ref 1–1.03)
SQUAMOUS #/AREA URNS HPF: ABNORMAL /HPF
TRICYCLICS UR QL SCN: NEGATIVE
UROBILINOGEN UR QL STRIP: ABNORMAL
WBC # UR STRIP: ABNORMAL /HPF
WBC NRBC COR # BLD: 7.01 10*3/MM3 (ref 3.4–10.8)
WHOLE BLOOD HOLD COAG: NORMAL
WHOLE BLOOD HOLD SPECIMEN: NORMAL

## 2022-08-04 PROCEDURE — 85025 COMPLETE CBC W/AUTO DIFF WBC: CPT | Performed by: PHYSICIAN ASSISTANT

## 2022-08-04 PROCEDURE — 93005 ELECTROCARDIOGRAM TRACING: CPT | Performed by: STUDENT IN AN ORGANIZED HEALTH CARE EDUCATION/TRAINING PROGRAM

## 2022-08-04 PROCEDURE — 80306 DRUG TEST PRSMV INSTRMNT: CPT | Performed by: PHYSICIAN ASSISTANT

## 2022-08-04 PROCEDURE — 36415 COLL VENOUS BLD VENIPUNCTURE: CPT

## 2022-08-04 PROCEDURE — 82077 ASSAY SPEC XCP UR&BREATH IA: CPT | Performed by: PHYSICIAN ASSISTANT

## 2022-08-04 PROCEDURE — 83735 ASSAY OF MAGNESIUM: CPT | Performed by: PHYSICIAN ASSISTANT

## 2022-08-04 PROCEDURE — 80053 COMPREHEN METABOLIC PANEL: CPT | Performed by: PHYSICIAN ASSISTANT

## 2022-08-04 PROCEDURE — 81025 URINE PREGNANCY TEST: CPT | Performed by: PHYSICIAN ASSISTANT

## 2022-08-04 PROCEDURE — 81001 URINALYSIS AUTO W/SCOPE: CPT | Performed by: PHYSICIAN ASSISTANT

## 2022-08-04 PROCEDURE — 87636 SARSCOV2 & INF A&B AMP PRB: CPT | Performed by: PHYSICIAN ASSISTANT

## 2022-08-04 PROCEDURE — 99285 EMERGENCY DEPT VISIT HI MDM: CPT

## 2022-08-04 RX ORDER — ESCITALOPRAM OXALATE 10 MG/1
10 TABLET ORAL DAILY
Status: DISCONTINUED | OUTPATIENT
Start: 2022-08-05 | End: 2022-08-05

## 2022-08-04 RX ORDER — ONDANSETRON 4 MG/1
4 TABLET, FILM COATED ORAL EVERY 6 HOURS PRN
Status: DISCONTINUED | OUTPATIENT
Start: 2022-08-04 | End: 2022-08-09 | Stop reason: HOSPADM

## 2022-08-04 RX ORDER — LOPERAMIDE HYDROCHLORIDE 2 MG/1
2 CAPSULE ORAL
Status: DISCONTINUED | OUTPATIENT
Start: 2022-08-04 | End: 2022-08-09 | Stop reason: HOSPADM

## 2022-08-04 RX ORDER — RISPERIDONE 1 MG/1
1 TABLET ORAL 2 TIMES DAILY
Status: DISCONTINUED | OUTPATIENT
Start: 2022-08-05 | End: 2022-08-07

## 2022-08-04 RX ORDER — TRAZODONE HYDROCHLORIDE 50 MG/1
50 TABLET ORAL NIGHTLY PRN
Status: DISCONTINUED | OUTPATIENT
Start: 2022-08-04 | End: 2022-08-09 | Stop reason: HOSPADM

## 2022-08-04 RX ORDER — ACETAMINOPHEN 325 MG/1
650 TABLET ORAL EVERY 6 HOURS PRN
Status: DISCONTINUED | OUTPATIENT
Start: 2022-08-04 | End: 2022-08-09 | Stop reason: HOSPADM

## 2022-08-04 RX ORDER — BENZONATATE 100 MG/1
100 CAPSULE ORAL 3 TIMES DAILY PRN
Status: DISCONTINUED | OUTPATIENT
Start: 2022-08-04 | End: 2022-08-09 | Stop reason: HOSPADM

## 2022-08-04 RX ORDER — IBUPROFEN 400 MG/1
400 TABLET ORAL EVERY 6 HOURS PRN
Status: DISCONTINUED | OUTPATIENT
Start: 2022-08-04 | End: 2022-08-09 | Stop reason: HOSPADM

## 2022-08-04 RX ORDER — HYDROXYZINE 50 MG/1
50 TABLET, FILM COATED ORAL EVERY 6 HOURS PRN
Status: DISCONTINUED | OUTPATIENT
Start: 2022-08-04 | End: 2022-08-09 | Stop reason: HOSPADM

## 2022-08-04 RX ORDER — HYDROXYZINE HYDROCHLORIDE 25 MG/1
25 TABLET, FILM COATED ORAL ONCE
Status: COMPLETED | OUTPATIENT
Start: 2022-08-04 | End: 2022-08-04

## 2022-08-04 RX ORDER — CETIRIZINE HYDROCHLORIDE 10 MG/1
10 TABLET ORAL DAILY
Status: DISCONTINUED | OUTPATIENT
Start: 2022-08-05 | End: 2022-08-09 | Stop reason: HOSPADM

## 2022-08-04 RX ORDER — FAMOTIDINE 20 MG/1
20 TABLET, FILM COATED ORAL 2 TIMES DAILY PRN
Status: DISCONTINUED | OUTPATIENT
Start: 2022-08-04 | End: 2022-08-09 | Stop reason: HOSPADM

## 2022-08-04 RX ORDER — BENZTROPINE MESYLATE 1 MG/1
2 TABLET ORAL ONCE AS NEEDED
Status: DISCONTINUED | OUTPATIENT
Start: 2022-08-04 | End: 2022-08-09 | Stop reason: HOSPADM

## 2022-08-04 RX ORDER — ALUMINA, MAGNESIA, AND SIMETHICONE 2400; 2400; 240 MG/30ML; MG/30ML; MG/30ML
15 SUSPENSION ORAL EVERY 6 HOURS PRN
Status: DISCONTINUED | OUTPATIENT
Start: 2022-08-04 | End: 2022-08-09 | Stop reason: HOSPADM

## 2022-08-04 RX ORDER — BENZTROPINE MESYLATE 1 MG/ML
1 INJECTION INTRAMUSCULAR; INTRAVENOUS ONCE AS NEEDED
Status: DISCONTINUED | OUTPATIENT
Start: 2022-08-04 | End: 2022-08-09 | Stop reason: HOSPADM

## 2022-08-04 RX ORDER — ECHINACEA PURPUREA EXTRACT 125 MG
2 TABLET ORAL AS NEEDED
Status: DISCONTINUED | OUTPATIENT
Start: 2022-08-04 | End: 2022-08-09 | Stop reason: HOSPADM

## 2022-08-04 RX ADMIN — HYDROXYZINE HYDROCHLORIDE 50 MG: 50 TABLET ORAL at 20:28

## 2022-08-04 RX ADMIN — ACETAMINOPHEN 325MG 650 MG: 325 TABLET ORAL at 21:21

## 2022-08-04 RX ADMIN — HYDROXYZINE HYDROCHLORIDE 25 MG: 25 TABLET ORAL at 17:27

## 2022-08-04 RX ADMIN — TRAZODONE HYDROCHLORIDE 50 MG: 50 TABLET ORAL at 20:28

## 2022-08-04 NOTE — ED PROVIDER NOTES
Subjective   22-year-old female presents to the ED today for mental health evaluation.  She states she started to have suicidal ideations yesterday.  She was recently admitted to the [facility] and was discharged about 2 days ago.  She states they started her on Lexapro and it is not helping.  She states she feels like it is making her symptoms worse.  She has a plan to cut her wrist.  She denies any homicidal ideations.  She denies any drug or alcohol use.  She states her appetite and sleep have been poor.  She denies any hallucinations.      History provided by:  Patient  Mental Health Problem  Presenting symptoms: depression and suicidal thoughts    Presenting symptoms: no hallucinations    Degree of incapacity (severity):  Moderate  Onset quality:  Sudden  Duration:  1 day  Timing:  Constant  Progression:  Worsening  Chronicity:  Recurrent  Context: recent medication change    Context: not alcohol use and not drug abuse    Relieved by:  Nothing  Worsened by:  Nothing  Associated symptoms: appetite change and insomnia    Risk factors: hx of mental illness and recent psychiatric admission        Review of Systems   Constitutional: Positive for appetite change.   HENT: Negative.    Eyes: Negative.    Respiratory: Negative.    Cardiovascular: Negative.    Gastrointestinal: Negative.    Genitourinary: Negative.    Musculoskeletal: Negative.    Skin: Negative.    Neurological: Negative.    Psychiatric/Behavioral: Positive for dysphoric mood, sleep disturbance and suicidal ideas. Negative for hallucinations. The patient has insomnia.    All other systems reviewed and are negative.      Past Medical History:   Diagnosis Date   • Anxiety    • Depression    • Panic disorder    • Suicide attempt (HCC)        Allergies   Allergen Reactions   • Morphine Anaphylaxis   • Latex, Natural Rubber Hives       Past Surgical History:   Procedure Laterality Date   • WISDOM TOOTH EXTRACTION Bilateral        Family History    Problem Relation Age of Onset   • Cancer Mother    • Suicide Attempts Sister        Social History     Socioeconomic History   • Marital status: Significant Other   • Number of children: 2   • Years of education: 12   • Highest education level: High school graduate   Tobacco Use   • Smoking status: Never Smoker   • Smokeless tobacco: Never Used   Vaping Use   • Vaping Use: Never used   Substance and Sexual Activity   • Alcohol use: Never     Comment: denies   • Drug use: Never     Comment: denies any drug use   • Sexual activity: Yes     Partners: Male     Birth control/protection: I.U.D.           Objective   Physical Exam  Vitals and nursing note reviewed.   Constitutional:       General: She is not in acute distress.     Appearance: Normal appearance.   HENT:      Head: Normocephalic and atraumatic.      Right Ear: External ear normal.      Left Ear: External ear normal.   Eyes:      Conjunctiva/sclera: Conjunctivae normal.      Pupils: Pupils are equal, round, and reactive to light.   Cardiovascular:      Rate and Rhythm: Regular rhythm. Tachycardia present.      Pulses: Normal pulses.      Heart sounds: Normal heart sounds.   Pulmonary:      Effort: Pulmonary effort is normal.      Breath sounds: Normal breath sounds.   Abdominal:      General: Bowel sounds are normal.      Palpations: Abdomen is soft.   Musculoskeletal:         General: Normal range of motion.      Cervical back: Normal range of motion and neck supple.   Skin:     General: Skin is warm and dry.      Capillary Refill: Capillary refill takes less than 2 seconds.   Neurological:      General: No focal deficit present.      Mental Status: She is alert and oriented to person, place, and time.   Psychiatric:         Mood and Affect: Mood is anxious.         Speech: Speech normal.         Behavior: Behavior normal. Behavior is cooperative.         Thought Content: Thought content includes suicidal ideation. Thought content does not include  homicidal ideation. Thought content includes suicidal plan.         Procedures           ED Course  ED Course as of 08/04/22 1943   Thu Aug 04, 2022   1940 Medically clear for psych [AH]      ED Course User Index  [AH] Jaelyn Clark PA                                           MDM  Number of Diagnoses or Management Options     Amount and/or Complexity of Data Reviewed  Clinical lab tests: reviewed    Patient Progress  Patient progress: stable      Final diagnoses:   Depression with suicidal ideation       ED Disposition  ED Disposition     ED Disposition   DC/Transfer to Behavioral Health    Condition   Stable    Comment   --             No follow-up provider specified.       Medication List      No changes were made to your prescriptions during this visit.          Jaelyn Clark PA  08/04/22 1944

## 2022-08-04 NOTE — NURSING NOTE
Patient to intake and is very tearful. Accompanied by hospital . Patient states that she was just in the [facility] and two days ago signed herself out. She states that she thought that she would be ok but is not. She states that she has not slept at all, mind racing, and is suicidal. She states that the medicines that she is on is making her crazy and she cant be around her two boys. She states that she woke up today and felt real anxious and nervous and told her fiance that she was going to go and cut her wrists and just be done with it. Anxiety and depression 10/10. Denies etoh or drug use.

## 2022-08-05 PROBLEM — F41.1 GAD (GENERALIZED ANXIETY DISORDER): Status: ACTIVE | Noted: 2022-08-05

## 2022-08-05 PROBLEM — F41.9 ANXIETY DISORDER, UNSPECIFIED: Status: ACTIVE | Noted: 2022-08-05

## 2022-08-05 LAB
QT INTERVAL: 350 MS
QTC INTERVAL: 471 MS
TROPONIN T SERPL-MCNC: <0.01 NG/ML (ref 0–0.03)

## 2022-08-05 PROCEDURE — 63710000001 ONDANSETRON PER 8 MG: Performed by: STUDENT IN AN ORGANIZED HEALTH CARE EDUCATION/TRAINING PROGRAM

## 2022-08-05 PROCEDURE — 84484 ASSAY OF TROPONIN QUANT: CPT | Performed by: PSYCHIATRY & NEUROLOGY

## 2022-08-05 PROCEDURE — 99223 1ST HOSP IP/OBS HIGH 75: CPT | Performed by: PSYCHIATRY & NEUROLOGY

## 2022-08-05 PROCEDURE — 93005 ELECTROCARDIOGRAM TRACING: CPT | Performed by: PSYCHIATRY & NEUROLOGY

## 2022-08-05 RX ORDER — PROPRANOLOL HYDROCHLORIDE 10 MG/1
10 TABLET ORAL EVERY 8 HOURS PRN
Status: DISCONTINUED | OUTPATIENT
Start: 2022-08-05 | End: 2022-08-09 | Stop reason: HOSPADM

## 2022-08-05 RX ADMIN — CETIRIZINE HYDROCHLORIDE 10 MG: 10 TABLET, FILM COATED ORAL at 08:22

## 2022-08-05 RX ADMIN — HYDROXYZINE HYDROCHLORIDE 50 MG: 50 TABLET ORAL at 21:34

## 2022-08-05 RX ADMIN — ACETAMINOPHEN 325MG 650 MG: 325 TABLET ORAL at 20:19

## 2022-08-05 RX ADMIN — TRAZODONE HYDROCHLORIDE 50 MG: 50 TABLET ORAL at 20:19

## 2022-08-05 RX ADMIN — HYDROXYZINE HYDROCHLORIDE 50 MG: 50 TABLET ORAL at 09:29

## 2022-08-05 RX ADMIN — ONDANSETRON HYDROCHLORIDE 4 MG: 4 TABLET, FILM COATED ORAL at 08:22

## 2022-08-05 RX ADMIN — PROPRANOLOL HYDROCHLORIDE 10 MG: 10 TABLET ORAL at 15:42

## 2022-08-05 RX ADMIN — HYDROXYZINE HYDROCHLORIDE 50 MG: 50 TABLET ORAL at 15:32

## 2022-08-05 RX ADMIN — ESCITALOPRAM 10 MG: 10 TABLET, FILM COATED ORAL at 08:22

## 2022-08-05 RX ADMIN — RISPERIDONE 1 MG: 1 TABLET, FILM COATED ORAL at 20:19

## 2022-08-05 RX ADMIN — RISPERIDONE 1 MG: 1 TABLET, FILM COATED ORAL at 08:22

## 2022-08-05 NOTE — NURSING NOTE
"Patient was discharged from this facility two days ago. She reports medications are not working, she is having suicidal thoughts. Reports \"everything I see I want to harm myself with\". She states she was not ready to go home, she feels \"paranoid\"- \"the world is going on around me and I'm in the middle of it\". She reports hearing her own thoughts and hearing a beeping noise. Anxiety rated 10/10, depression rated 5/10. She has not had anything to eat in two days. She slept 4-5 hours last night having difficulty falling and staying asleep.  "
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
## 2022-08-05 NOTE — NURSING NOTE
Pt complaining of chest pain, assessed heart tonmes s1,s2 noted lungs clear. Pt /97, , R 22, temp 97.7 and O2 97%. Stat EKG and troponin order placed and Dr on the unit and notifed of chest pain and increased HR. Pt reports she feels like it is related to anxiety.

## 2022-08-05 NOTE — PLAN OF CARE
Goal Outcome Evaluation:  Plan of Care Reviewed With: patient  Patient Agreement with Plan of Care: agrees     Progress: no change  Outcome Evaluation: Pt tearful when speaking to staff voiced feeling very overwhelmed and worried about anxiety worsening. Pt reported chest pain, EKG and troponin obtained, Dr Paula is aware of results. New order in chart for anxiety and HR, pt reports voiced improvement in anxiety and chest pain at this time. Pt reported she is SI to cut wrists but has agreed to come to staff with changes in SI or plan and has reported all mental health concerns to nursing staff. Reports anxiety and depression 10/10 and sleep and appetite poor. Reports hearing a constant beeping noise.
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
## 2022-08-05 NOTE — NURSING NOTE
Follow up chest pain vitals 129/86, , 20 Dr Paula on the unit and notified of increased HR, reports he will place new orders.

## 2022-08-06 PROCEDURE — 99232 SBSQ HOSP IP/OBS MODERATE 35: CPT | Performed by: PSYCHIATRY & NEUROLOGY

## 2022-08-06 PROCEDURE — 63710000001 ONDANSETRON PER 8 MG: Performed by: STUDENT IN AN ORGANIZED HEALTH CARE EDUCATION/TRAINING PROGRAM

## 2022-08-06 RX ADMIN — ONDANSETRON HYDROCHLORIDE 4 MG: 4 TABLET, FILM COATED ORAL at 18:11

## 2022-08-06 RX ADMIN — HYDROXYZINE HYDROCHLORIDE 50 MG: 50 TABLET ORAL at 13:06

## 2022-08-06 RX ADMIN — RISPERIDONE 1 MG: 1 TABLET, FILM COATED ORAL at 08:08

## 2022-08-06 RX ADMIN — ACETAMINOPHEN 325MG 650 MG: 325 TABLET ORAL at 07:06

## 2022-08-06 RX ADMIN — HYDROXYZINE HYDROCHLORIDE 50 MG: 50 TABLET ORAL at 20:22

## 2022-08-06 RX ADMIN — TRAZODONE HYDROCHLORIDE 50 MG: 50 TABLET ORAL at 20:22

## 2022-08-06 RX ADMIN — ONDANSETRON HYDROCHLORIDE 4 MG: 4 TABLET, FILM COATED ORAL at 07:33

## 2022-08-06 RX ADMIN — IBUPROFEN 400 MG: 400 TABLET, FILM COATED ORAL at 10:07

## 2022-08-06 RX ADMIN — RISPERIDONE 1 MG: 1 TABLET, FILM COATED ORAL at 20:23

## 2022-08-06 RX ADMIN — CETIRIZINE HYDROCHLORIDE 10 MG: 10 TABLET, FILM COATED ORAL at 08:08

## 2022-08-06 RX ADMIN — HYDROXYZINE HYDROCHLORIDE 50 MG: 50 TABLET ORAL at 07:06

## 2022-08-06 RX ADMIN — PROPRANOLOL HYDROCHLORIDE 10 MG: 10 TABLET ORAL at 17:16

## 2022-08-06 RX ADMIN — PROPRANOLOL HYDROCHLORIDE 10 MG: 10 TABLET ORAL at 07:34
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
## 2022-08-06 NOTE — PLAN OF CARE
Goal Outcome Evaluation:  Plan of Care Reviewed With: patient  Patient Agreement with Plan of Care: agrees     Progress: improving     Pt slept well, in room majority of shift, and had evening snack.

## 2022-08-06 NOTE — PLAN OF CARE
Goal Outcome Evaluation:  Plan of Care Reviewed With: patient  Patient Agreement with Plan of Care: agrees     Progress: improving  Outcome Evaluation: Patient rates anxiety 8 and depression 8, denies thoughts of harming self and others, and denies hallucinations.

## 2022-08-07 PROCEDURE — 63710000001 ONDANSETRON PER 8 MG: Performed by: STUDENT IN AN ORGANIZED HEALTH CARE EDUCATION/TRAINING PROGRAM

## 2022-08-07 PROCEDURE — 99232 SBSQ HOSP IP/OBS MODERATE 35: CPT | Performed by: PSYCHIATRY & NEUROLOGY

## 2022-08-07 RX ORDER — OLANZAPINE 5 MG/1
5 TABLET ORAL NIGHTLY
Status: DISCONTINUED | OUTPATIENT
Start: 2022-08-07 | End: 2022-08-09 | Stop reason: HOSPADM

## 2022-08-07 RX ADMIN — OLANZAPINE 5 MG: 5 TABLET, FILM COATED ORAL at 20:31

## 2022-08-07 RX ADMIN — ACETAMINOPHEN 325MG 650 MG: 325 TABLET ORAL at 22:04

## 2022-08-07 RX ADMIN — CETIRIZINE HYDROCHLORIDE 10 MG: 10 TABLET, FILM COATED ORAL at 08:02

## 2022-08-07 RX ADMIN — HYDROXYZINE HYDROCHLORIDE 50 MG: 50 TABLET ORAL at 22:04

## 2022-08-07 RX ADMIN — HYDROXYZINE HYDROCHLORIDE 50 MG: 50 TABLET ORAL at 16:18

## 2022-08-07 RX ADMIN — RISPERIDONE 1 MG: 1 TABLET, FILM COATED ORAL at 08:02

## 2022-08-07 RX ADMIN — ONDANSETRON HYDROCHLORIDE 4 MG: 4 TABLET, FILM COATED ORAL at 08:46

## 2022-08-07 RX ADMIN — HYDROXYZINE HYDROCHLORIDE 50 MG: 50 TABLET ORAL at 08:07

## 2022-08-07 RX ADMIN — TRAZODONE HYDROCHLORIDE 50 MG: 50 TABLET ORAL at 20:32

## 2022-08-07 NOTE — PLAN OF CARE
Goal Outcome Evaluation:  Plan of Care Reviewed With: patient  Patient Agreement with Plan of Care: agrees     Progress: improving  Outcome Evaluation: Pt stated her appetite was fair and she was having trouble falling asleep and staying asleep. pt rated her anxiety a 10/10 but stated she had no depression. Pt stated she felt hopeful. Pt stated she had no si/hi or avh. Pt was calm and cooperative with no other issues or concerns.

## 2022-08-07 NOTE — PROGRESS NOTES
"INPATIENT PSYCHIATRIC PROGRESS NOTE    Name:  Tori Arizmendi  :  2000  MRN:  [id number]  Visit Number:  [id number]  Length of stay:  3    SUBJECTIVE    CC/Focus of Exam: depression, anxiety, SI    INTERVAL HISTORY:  The patient has been more calm over the last 24 hours.  She reports ongoing anxiety but no depression.  Today again she is fixated on medication and states that risperidone is not helping her and she wants to take olanzapine now.  She is struggling with identifying the stressors in her life and then working towards solutions and it could be a factor of her intellectual disability.    Depression rating 0/10  Anxiety rating 10/10  Sleep: not good  Withdrawal sx: denies  Cravin/10    Review of Systems   Constitutional: Negative.    Respiratory: Negative.    Cardiovascular: Negative.    Psychiatric/Behavioral: The patient is nervous/anxious.        OBJECTIVE    Temp:  [96.9 °F (36.1 °C)-97.9 °F (36.6 °C)] 97.9 °F (36.6 °C)  Heart Rate:  [] 99  Resp:  [16-18] 16  BP: (107-128)/(67-83) 120/78    MENTAL STATUS EXAM:  Appearance:Casually dressed, good hygeine.   Cooperation:Cooperative  Psychomotor: No psychomotor agitation/retardation, No EPS, No motor tics  Speech-normal rate, amount.  Mood \"anxious\"   Affect-congruent, appropriate, stable  Thought Content-goal directed, no delusional material present  Thought process-linear, organized.  Suicidality: No SI  Homicidality: No HI  Perception: No AH/VH  Insight-poor   Judgement-poor    Lab Results (last 24 hours)     ** No results found for the last 24 hours. **             Imaging Results (Last 24 Hours)     ** No results found for the last 24 hours. **             ECG/EMG Results (most recent)     Procedure Component Value Units Date/Time    ECG 12 Lead [[id number]] Collected: 22     Updated: 22     QT Interval 364 ms      QTC Interval 464 ms     Narrative:      Test Reason : Baseline Cardiac Status  Blood Pressure :   */* "   mmHG  Vent. Rate :  98 BPM     Atrial Rate :  98 BPM     P-R Int : 118 ms          QRS Dur :  80 ms      QT Int : 364 ms       P-R-T Axes :  71  71  27 degrees     QTc Int : 464 ms    Normal sinus rhythm n  Normal ECG  Confirmed by Nena Cordero ([id number]) on 8/4/2022 10:58:55 PM    Referred By:            Confirmed By: Nena Cordero    ECG 12 Lead [[id number]] Collected: 08/05/22 1345     Updated: 08/05/22 2037     QT Interval 350 ms      QTC Interval 471 ms     Narrative:      Test Reason : chest pain  Blood Pressure :   */*   mmHG  Vent. Rate : 109 BPM     Atrial Rate : 109 BPM     P-R Int : 122 ms          QRS Dur :  76 ms      QT Int : 350 ms       P-R-T Axes :  64  80  56 degrees     QTc Int : 471 ms    Sinus tachycardia  Otherwise normal ECG  Confirmed by Nena Cordero ([id number]) on 8/5/2022 8:37:21 PM    Referred By: AZRA           Confirmed By: Nena Cordero           ALLERGIES: Morphine and Latex, natural rubber      Current Facility-Administered Medications:   •  acetaminophen (TYLENOL) tablet 650 mg, 650 mg, Oral, Q6H PRN, Bhavana Beltran MD, 650 mg at 08/06/22 0706  •  aluminum-magnesium hydroxide-simethicone (MAALOX MAX) 400-400-40 MG/5ML suspension 15 mL, 15 mL, Oral, Q6H PRN, Bhavana Beltran MD  •  benzonatate (TESSALON) capsule 100 mg, 100 mg, Oral, TID PRN, Bhavana Beltran MD  •  benztropine (COGENTIN) tablet 2 mg, 2 mg, Oral, Once PRN **OR** benztropine (COGENTIN) injection 1 mg, 1 mg, Intramuscular, Once PRN, Bhavana Beltran MD  •  cetirizine (zyrTEC) tablet 10 mg, 10 mg, Oral, Daily, Bhavana Beltran MD, 10 mg at 08/07/22 0802  •  famotidine (PEPCID) tablet 20 mg, 20 mg, Oral, BID PRN, Bhavana Beltran MD  •  hydrOXYzine (ATARAX) tablet 50 mg, 50 mg, Oral, Q6H PRN, Bhavana Beltran MD, 50 mg at 08/07/22 0807  •  ibuprofen (ADVIL,MOTRIN) tablet 400 mg, 400 mg, Oral, Q6H PRN, Bhavana Beltran MD, 400 mg at 08/06/22 1007  •  loperamide (IMODIUM) capsule 2 mg,  2 mg, Oral, Q2H PRN, Bhavana Beltran MD  •  magnesium hydroxide (MILK OF MAGNESIA) suspension 10 mL, 10 mL, Oral, Daily PRN, Bhavana Beltran MD  •  ondansetron (ZOFRAN) tablet 4 mg, 4 mg, Oral, Q6H PRN, Bhavana Beltran MD, 4 mg at 08/07/22 0846  •  propranolol (INDERAL) tablet 10 mg, 10 mg, Oral, Q8H PRN, Sienna Paula MD, 10 mg at 08/06/22 1716  •  risperiDONE (risperDAL) tablet 1 mg, 1 mg, Oral, BID, Bhavana Beltran MD, 1 mg at 08/07/22 0802  •  sodium chloride nasal spray 2 spray, 2 spray, Each Nare, PRN, Bhavana Beltran MD  •  traZODone (DESYREL) tablet 50 mg, 50 mg, Oral, Nightly PRN, Bhavana Beltran MD, 50 mg at 08/06/22 2022    ASSESSMENT & PLAN:      Severe episode of recurrent major depressive disorder, without psychotic features (HCC)  -Stop risperidone 1 mg bid   -Start olanzapine 5 mg at bedtime       Suicidal ideation  - SP3        Anxiety disorder unspecified  - Inderal 10 mg tid prn anxiety    Special precautions: Special Precautions Level 3 (q15 min checks) .    Behavioral Health Treatment Plan and Problem List: I have reviewed and approved the Behavioral Health Treatment Plan and Problem list.  The patient has had a chance to review and agrees with the treatment plan.     Clinician:  Sienna Paula MD  08/07/22  14:28 EDT

## 2022-08-07 NOTE — PLAN OF CARE
Goal Outcome Evaluation:  Plan of Care Reviewed With: patient  Patient Agreement with Plan of Care: agrees     Progress: no change  Outcome Evaluation: Patient has been crying on and off today, reports Anxiety 10, Depression 6, states that she is stressing over her medication and only wants to feel better. Denies SI, HI AVH. She is cooperative with staff.

## 2022-08-08 PROCEDURE — 99232 SBSQ HOSP IP/OBS MODERATE 35: CPT | Performed by: PSYCHIATRY & NEUROLOGY

## 2022-08-08 RX ADMIN — ACETAMINOPHEN 325MG 650 MG: 325 TABLET ORAL at 21:06

## 2022-08-08 RX ADMIN — ACETAMINOPHEN 325MG 650 MG: 325 TABLET ORAL at 10:54

## 2022-08-08 RX ADMIN — HYDROXYZINE HYDROCHLORIDE 50 MG: 50 TABLET ORAL at 15:11

## 2022-08-08 RX ADMIN — TRAZODONE HYDROCHLORIDE 50 MG: 50 TABLET ORAL at 20:12

## 2022-08-08 RX ADMIN — HYDROXYZINE HYDROCHLORIDE 50 MG: 50 TABLET ORAL at 08:06

## 2022-08-08 RX ADMIN — OLANZAPINE 5 MG: 5 TABLET, FILM COATED ORAL at 20:12

## 2022-08-08 RX ADMIN — CETIRIZINE HYDROCHLORIDE 10 MG: 10 TABLET, FILM COATED ORAL at 08:05

## 2022-08-08 RX ADMIN — PROPRANOLOL HYDROCHLORIDE 10 MG: 10 TABLET ORAL at 10:54

## 2022-08-08 NOTE — DISCHARGE INSTR - APPOINTMENTS
[facility]  [facility] [address]  [phone]    August 11 2022 at 9:00am with Ruma.

## 2022-08-08 NOTE — PLAN OF CARE
Problem: Adult Behavioral Health Plan of Care  Goal: Patient-Specific Goal (Individualization)  Outcome: Ongoing, Progressing  Flowsheets  Taken 8/8/2022 1549  Anxieties, Fears or Concerns: worried today about getting back to work  Taken 8/5/2022 1517  Patient-Specific Goals (Include Timeframe): Tori to deny suicidal ideation prior to discharge, identify 1-2 healthy coping skills during her 3 to 7-day hospital stay, engage in safe disposition planning prior to discharge  Individualized Care Needs: Medication management, individual and group therapy  Taken 8/5/2022 1514  Patient Personal Strengths:   expressive of emotions   motivated for treatment  Patient Vulnerabilities:   poor impulse control   traumatic event   history of unsuccessful treatment   lacks insight into illness     Problem: Adult Behavioral Health Plan of Care  Goal: Optimized Coping Skills in Response to Life Stressors  Outcome: Ongoing, Progressing  Flowsheets (Taken 8/8/2022 1549)  Optimized Coping Skills in Response to Life Stressors: making progress toward outcome     Problem: Adult Behavioral Health Plan of Care  Goal: Optimized Coping Skills in Response to Life Stressors  Intervention: Promote Effective Coping Strategies  Flowsheets (Taken 8/8/2022 1543)  Supportive Measures:   active listening utilized   positive reinforcement provided   self-responsibility promoted   counseling provided   problem-solving facilitated   verbalization of feelings encouraged   decision-making supported   goal-setting facilitated   self-care encouraged   self-reflection promoted     Problem: Adult Behavioral Health Plan of Care  Goal: Develops/Participates in Therapeutic [place] to Support Successful Transition  Intervention: Foster Therapeutic [place]  Flowsheets (Taken 8/8/2022 154)  Trust Relationship/Rapport:   care explained   reassurance provided   choices provided   thoughts/feelings acknowledged   emotional support provided   empathic listening  provided   questions answered   questions encouraged     Problem: Adult Behavioral Health Plan of Care  Goal: Develops/Participates in Therapeutic [place] to Support Successful Transition  Intervention: Mutually Develop Transition Plan  Flowsheets (Taken 8/8/2022 3776)  Transition Support:   community resources reviewed   crisis management plan promoted   crisis management plan verbalized   follow-up care coordinated   follow-up care discussed  Transportation Anticipated: family or friend will provide  Transportation Concerns: none  Current Discharge Risk: psychiatric illness  Concerns to be Addressed:   coping/stress   mental health   suicidal  Readmission Within the Last 30 Days: previous discharge plan unsuccessful  Patient/Family Anticipated Services at Transition:   outpatient care   mental health services  Patient's Choice of Community Agency(s): [facility]  Patient/Family Anticipates Transition to: home with family  Offered/Gave Vendor List: no  Data:  Therapist reviewed Dr. Paula's assessment, discussed patient with nursing staff and met with patient this date to further discuss patient progress, review healthy coping and safe disposition.      Clinical Maneuvering/Intervention:    Therapist assisted patient in processing above session content; acknowledged and normalized patient's thoughts, feelings and concerns.  Encouraged patient to discuss/vent feelings, frustrations, and fears concerning their ongoing issues and validated patients feelings.  Discussed the importance of healthy coping and reviewed healthy coping skills such as distraction, thought reframing/redirecting, grounding, mindfulness, etc.  Reviewed safe disposition with patient.    Assessment:  Patient denies suicidal ideation/homicidal ideation.  Patient reports decrease in depression and anxiety today.  Patient states she feels the medication changes have been helpful to her.  She discussed that she has been practicing deep  breathing and walking as well as negative thought redirection this weekend and it has been helpful to her.  Assisted with phone call to [employer] in [place] today where patient works.  Patient reports she is anxious to get back to work and get home.    Plan:  Patient will continue hospitalization/medication management. Patient will return home upon stabilization.  Patient will engage in aftercare with [facility].

## 2022-08-08 NOTE — PLAN OF CARE
Goal Outcome Evaluation:  Plan of Care Reviewed With: patient  Patient Agreement with Plan of Care: agrees     Progress: improving  Outcome Evaluation: Pt rates Anx 8 Dep 10 Denies SI. HI, or AVH reports poor appetite and sleep pattern patient interacting well with peers

## 2022-08-08 NOTE — PLAN OF CARE
Goal Outcome Evaluation:  Plan of Care Reviewed With: patient  Patient Agreement with Plan of Care: agrees     Progress: improving  Outcome Evaluation: Pt was calm and cooperative and no issues or concerns this shift.

## 2022-08-08 NOTE — PROGRESS NOTES
"INPATIENT PSYCHIATRIC PROGRESS NOTE    Name:  Tori Arizmendi  :  2000  MRN:  [id number]  Visit Number:  [id number]  Length of stay:  4    SUBJECTIVE    CC/Focus of Exam: depression, anxiety, SI    INTERVAL HISTORY:  The patient states she is feeling better as olanzapine is helping. Also she was able to talk to her BF over the phone and he has told her he is quitting marijuana and going back to work and also will be back in [employer] and it has made her feel a lot better. She states they are hoping to get  soon.   Depression rating 6/10  Anxiety rating 10/10  Sleep: godd  Withdrawal sx: denies  Cravin/10    Review of Systems   Constitutional: Negative.    Respiratory: Negative.    Cardiovascular: Negative.    Psychiatric/Behavioral: Positive for dysphoric mood. The patient is nervous/anxious.        OBJECTIVE    Temp:  [97.3 °F (36.3 °C)-97.6 °F (36.4 °C)] 97.6 °F (36.4 °C)  Heart Rate:  [] 95  Resp:  [18] 18  BP: (126-136)/(84-91) 136/84    MENTAL STATUS EXAM:  Appearance:Casually dressed, good hygeine.   Cooperation:Cooperative  Psychomotor: No psychomotor agitation/retardation, No EPS, No motor tics  Speech-normal rate, amount.  Mood \"anxious\"   Affect-congruent, appropriate, stable  Thought Content-goal directed, no delusional material present  Thought process-linear, organized.  Suicidality: No SI  Homicidality: No HI  Perception: No AH/VH  Insight-poor   Judgement-poor    Lab Results (last 24 hours)     ** No results found for the last 24 hours. **             Imaging Results (Last 24 Hours)     ** No results found for the last 24 hours. **             ECG/EMG Results (most recent)     Procedure Component Value Units Date/Time    ECG 12 Lead [[id number]] Collected: 22     Updated: 22     QT Interval 364 ms      QTC Interval 464 ms     Narrative:      Test Reason : Baseline Cardiac Status  Blood Pressure :   */*   mmHG  Vent. Rate :  98 BPM     Atrial Rate :  98 BPM    "  P-R Int : 118 ms          QRS Dur :  80 ms      QT Int : 364 ms       P-R-T Axes :  71  71  27 degrees     QTc Int : 464 ms    Normal sinus rhythm n  Normal ECG  Confirmed by Nena Cordero ([id number]) on 8/4/2022 10:58:55 PM    Referred By:            Confirmed By: Nena Cordero    ECG 12 Lead [[id number]] Collected: 08/05/22 1345     Updated: 08/05/22 2037     QT Interval 350 ms      QTC Interval 471 ms     Narrative:      Test Reason : chest pain  Blood Pressure :   */*   mmHG  Vent. Rate : 109 BPM     Atrial Rate : 109 BPM     P-R Int : 122 ms          QRS Dur :  76 ms      QT Int : 350 ms       P-R-T Axes :  64  80  56 degrees     QTc Int : 471 ms    Sinus tachycardia  Otherwise normal ECG  Confirmed by Nena Cordero ([id number]) on 8/5/2022 8:37:21 PM    Referred By: AZRA           Confirmed By: Nena Cordero           ALLERGIES: Morphine and Latex, natural rubber      Current Facility-Administered Medications:   •  acetaminophen (TYLENOL) tablet 650 mg, 650 mg, Oral, Q6H PRN, Bhavana Beltran MD, 650 mg at 08/07/22 2204  •  aluminum-magnesium hydroxide-simethicone (MAALOX MAX) 400-400-40 MG/5ML suspension 15 mL, 15 mL, Oral, Q6H PRN, Bhavana Beltran MD  •  benzonatate (TESSALON) capsule 100 mg, 100 mg, Oral, TID PRN, Bhavana Beltran MD  •  benztropine (COGENTIN) tablet 2 mg, 2 mg, Oral, Once PRN **OR** benztropine (COGENTIN) injection 1 mg, 1 mg, Intramuscular, Once PRN, Bhavana Beltran MD  •  cetirizine (zyrTEC) tablet 10 mg, 10 mg, Oral, Daily, Bhavana Beltran MD, 10 mg at 08/08/22 0805  •  famotidine (PEPCID) tablet 20 mg, 20 mg, Oral, BID PRN, Bhavana Beltran MD  •  hydrOXYzine (ATARAX) tablet 50 mg, 50 mg, Oral, Q6H PRN, Bhavana Beltran MD, 50 mg at 08/08/22 0806  •  ibuprofen (ADVIL,MOTRIN) tablet 400 mg, 400 mg, Oral, Q6H PRN, Bhavana Beltran MD, 400 mg at 08/06/22 1007  •  loperamide (IMODIUM) capsule 2 mg, 2 mg, Oral, Q2H PRN, Bhavana Beltran MD  •   magnesium hydroxide (MILK OF MAGNESIA) suspension 10 mL, 10 mL, Oral, Daily PRN, Bhavana Beltran MD  •  OLANZapine (zyPREXA) tablet 5 mg, 5 mg, Oral, Nightly, Sienna Paula MD, 5 mg at 08/07/22 2031  •  ondansetron (ZOFRAN) tablet 4 mg, 4 mg, Oral, Q6H PRN, Bhavana Beltran MD, 4 mg at 08/07/22 0846  •  propranolol (INDERAL) tablet 10 mg, 10 mg, Oral, Q8H PRN, Sienna Paula MD, 10 mg at 08/06/22 1716  •  sodium chloride nasal spray 2 spray, 2 spray, Each Nare, PRN, Bhavana Beltran MD  •  traZODone (DESYREL) tablet 50 mg, 50 mg, Oral, Nightly PRN, Bhavana Beltran MD, 50 mg at 08/07/22 2032    ASSESSMENT & PLAN:      Severe episode of recurrent major depressive disorder, without psychotic features (HCC)  -Continue olanzapine 5 mg at bedtime       Suicidal ideation  - SP3        Anxiety disorder unspecified  - Inderal 10 mg tid prn anxiety    Special precautions: Special Precautions Level 3 (q15 min checks) .    Behavioral Health Treatment Plan and Problem List: I have reviewed and approved the Behavioral Health Treatment Plan and Problem list.  The patient has had a chance to review and agrees with the treatment plan.     Clinician:  iSenna Paula MD  08/08/22  10:27 EDT

## 2022-08-09 VITALS
SYSTOLIC BLOOD PRESSURE: 128 MMHG | WEIGHT: 166 LBS | OXYGEN SATURATION: 96 % | HEART RATE: 86 BPM | BODY MASS INDEX: 26.06 KG/M2 | HEIGHT: 67 IN | RESPIRATION RATE: 18 BRPM | DIASTOLIC BLOOD PRESSURE: 78 MMHG | TEMPERATURE: 97.1 F

## 2022-08-09 PROCEDURE — 99238 HOSP IP/OBS DSCHRG MGMT 30/<: CPT | Performed by: PSYCHIATRY & NEUROLOGY

## 2022-08-09 RX ORDER — PROPRANOLOL HYDROCHLORIDE 10 MG/1
10 TABLET ORAL EVERY 8 HOURS PRN
Qty: 90 TABLET | Refills: 0 | Status: SHIPPED | OUTPATIENT
Start: 2022-08-09

## 2022-08-09 RX ORDER — OLANZAPINE 5 MG/1
5 TABLET ORAL NIGHTLY
Qty: 30 TABLET | Refills: 0 | Status: SHIPPED | OUTPATIENT
Start: 2022-08-09

## 2022-08-09 RX ORDER — HYDROXYZINE HYDROCHLORIDE 25 MG/1
25 TABLET, FILM COATED ORAL EVERY 8 HOURS PRN
Qty: 90 TABLET | Refills: 0 | Status: SHIPPED | OUTPATIENT
Start: 2022-08-09

## 2022-08-09 RX ORDER — TRAZODONE HYDROCHLORIDE 50 MG/1
50 TABLET ORAL NIGHTLY PRN
Qty: 30 TABLET | Refills: 0 | Status: SHIPPED | OUTPATIENT
Start: 2022-08-09

## 2022-08-09 RX ADMIN — HYDROXYZINE HYDROCHLORIDE 50 MG: 50 TABLET ORAL at 08:30

## 2022-08-09 RX ADMIN — CETIRIZINE HYDROCHLORIDE 10 MG: 10 TABLET, FILM COATED ORAL at 08:29

## 2022-08-09 NOTE — PLAN OF CARE
Goal Outcome Evaluation:  Plan of Care Reviewed With: patient  Patient Agreement with Plan of Care: agrees     Progress: improving  Outcome Evaluation: Patient discharging , leaving the unit with belongings. Patient denies suicidal or homicidal ideation

## 2022-08-09 NOTE — DISCHARGE SUMMARY
":  2000  MRN:  [id number]  Visit Number:  [id number]      Date of Admission:2022   Date of Discharge:  2022    Discharge Diagnosis:  Principal Problem:    Severe episode of recurrent major depressive disorder, without psychotic features (HCC)  Active Problems:    Suicidal ideation    Anxiety disorder, unspecified        Admission Diagnosis:  MDD (major depressive disorder) [F32.9]     PAM Arizmendi is a 22 y.o. female who was admitted on 2022 with complaints of depression and suicidal thoughts.   For details please see H&P dated 22.     Hospital Course  Patient is a 22 y.o. female presented with report of worsening depression and suicidal thoughts. The patient would blame it on her medication and didn't want to take escitalopram and it was stopped and risperidone was continued. The patient continued to reported severe anxiety and suicidal thoughts. At first patient denied any stressors, but was able to verbalize with encouragement her fears of relations with her boyfriend ending, and financial problems and not being able to take care of her children. 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 Her BF has not worked for two years and he also uses marijuana. The patient was encouraged to face her fears and work towards a solution. At that time she decided she needed to get back to work. The next day she was again fixated on her medication and this time she blamed risperidone as the problem and wanted it to be switched to olanzapine which was done. The next day she reported feeling better and also described a phone conversation with her BF who had agreed to go back to work. She continued to report feeling better and was no longer suicidal and was then discharged on 22.       Mental Status Exam upon discharge:   Mood \"good\"   Affect-congruent, appropriate, stable  Thought Content-goal " directed, no delusional material present  Thought process-linear, organized.  Suicidality: No SI  Homicidality: No HI  Perception: No AH/VH    Procedures Performed         Consults:   Consults     No orders found from 7/6/2022 to 8/5/2022.          Pertinent Test Results:   Admission on 08/04/2022   Component Date Value Ref Range Status   • QT Interval 08/04/2022 364  ms Final   • QTC Interval 08/04/2022 464  ms Final   • QT Interval 08/05/2022 350  ms Final   • QTC Interval 08/05/2022 471  ms Final   • Troponin T 08/05/2022 <0.010  0.000 - 0.030 ng/mL Final   Admission on 08/04/2022, Discharged on 08/04/2022   Component Date Value Ref Range Status   • Glucose 08/04/2022 110 (A) 65 - 99 mg/dL Final   • BUN 08/04/2022 8  6 - 20 mg/dL Final   • Creatinine 08/04/2022 0.79  0.57 - 1.00 mg/dL Final   • Sodium 08/04/2022 140  136 - 145 mmol/L Final   • Potassium 08/04/2022 3.7  3.5 - 5.2 mmol/L Final   • Chloride 08/04/2022 102  98 - 107 mmol/L Final   • CO2 08/04/2022 21.6 (A) 22.0 - 29.0 mmol/L Final   • Calcium 08/04/2022 10.6 (A) 8.6 - 10.5 mg/dL Final   • Total Protein 08/04/2022 7.8  6.0 - 8.5 g/dL Final   • Albumin 08/04/2022 5.50 (A) 3.50 - 5.20 g/dL Final   • ALT (SGPT) 08/04/2022 18  1 - 33 U/L Final   • AST (SGOT) 08/04/2022 12  1 - 32 U/L Final   • Alkaline Phosphatase 08/04/2022 84  39 - 117 U/L Final   • Total Bilirubin 08/04/2022 0.6  0.0 - 1.2 mg/dL Final   • Globulin 08/04/2022 2.3  gm/dL Final   • A/G Ratio 08/04/2022 2.4  g/dL Final   • BUN/Creatinine Ratio 08/04/2022 10.1  7.0 - 25.0 Final   • Anion Gap 08/04/2022 16.4 (A) 5.0 - 15.0 mmol/L Final   • eGFR 08/04/2022 107.3  >60.0 mL/min/1.73 Final    National Kidney Foundation and American Society of Nephrology (ASN) Task Force recommended calculation based on the Chronic Kidney Disease Epidemiology Collaboration (CKD-EPI) equation refit without adjustment for race.   • HCG, Urine QL 08/04/2022 Negative  Negative Final   • Color, UA 08/04/2022 Yellow   Yellow, Straw Final   • Appearance, UA 08/04/2022 Clear  Clear Final   • pH, UA 08/04/2022 8.0  5.0 - 8.0 Final   • Specific Gravity, UA 08/04/2022 1.008  1.005 - 1.030 Final   • Glucose, UA 08/04/2022 Negative  Negative Final   • Ketones, UA 08/04/2022 Trace (A) Negative Final   • Bilirubin, UA 08/04/2022 Negative  Negative Final   • Blood, UA 08/04/2022 Negative  Negative Final   • Protein, UA 08/04/2022 Negative  Negative Final   • Leuk Esterase, UA 08/04/2022 Trace (A) Negative Final   • Nitrite, UA 08/04/2022 Negative  Negative Final   • Urobilinogen, UA 08/04/2022 1.0 E.U./dL  0.2 - 1.0 E.U./dL Final   • Ethanol 08/04/2022 <10  0 - 10 mg/dL Final   • Ethanol % 08/04/2022 <0.010  % Final   • THC, Screen, Urine 08/04/2022 Negative  Negative Final   • Phencyclidine (PCP), Urine 08/04/2022 Negative  Negative Final   • Cocaine Screen, Urine 08/04/2022 Negative  Negative Final   • Methamphetamine, Ur 08/04/2022 Negative  Negative Final   • Opiate Screen 08/04/2022 Negative  Negative Final   • Amphetamine Screen, Urine 08/04/2022 Negative  Negative Final   • Benzodiazepine Screen, Urine 08/04/2022 Negative  Negative Final   • Tricyclic Antidepressants Screen 08/04/2022 Negative  Negative Final   • Methadone Screen, Urine 08/04/2022 Negative  Negative Final   • Barbiturates Screen, Urine 08/04/2022 Negative  Negative Final   • Oxycodone Screen, Urine 08/04/2022 Negative  Negative Final   • Propoxyphene Screen 08/04/2022 Negative  Negative Final   • Buprenorphine, Screen, Urine 08/04/2022 Negative  Negative Final   • Magnesium 08/04/2022 2.0  1.6 - 2.6 mg/dL Final   • COVID19 08/04/2022 Not Detected  Not Detected - Ref. Range Final   • Influenza A PCR 08/04/2022 Not Detected  Not Detected Final   • Influenza B PCR 08/04/2022 Not Detected  Not Detected Final   • WBC 08/04/2022 7.01  3.40 - 10.80 10*3/mm3 Final   • RBC 08/04/2022 5.03  3.77 - 5.28 10*6/mm3 Final   • Hemoglobin 08/04/2022 14.2  12.0 - 15.9 g/dL Final   •  Hematocrit 08/04/2022 42.2  34.0 - 46.6 % Final   • MCV 08/04/2022 83.9  79.0 - 97.0 fL Final   • MCH 08/04/2022 28.2  26.6 - 33.0 pg Final   • MCHC 08/04/2022 33.6  31.5 - 35.7 g/dL Final   • RDW 08/04/2022 13.7  12.3 - 15.4 % Final   • RDW-SD 08/04/2022 42.3  37.0 - 54.0 fl Final   • MPV 08/04/2022 9.6  6.0 - 12.0 fL Final   • Platelets 08/04/2022 272  140 - 450 10*3/mm3 Final   • Neutrophil % 08/04/2022 72.1  42.7 - 76.0 % Final   • Lymphocyte % 08/04/2022 22.4  19.6 - 45.3 % Final   • Monocyte % 08/04/2022 4.3 (A) 5.0 - 12.0 % Final   • Eosinophil % 08/04/2022 0.4  0.3 - 6.2 % Final   • Basophil % 08/04/2022 0.7  0.0 - 1.5 % Final   • Immature Grans % 08/04/2022 0.1  0.0 - 0.5 % Final   • Neutrophils, Absolute 08/04/2022 5.05  1.70 - 7.00 10*3/mm3 Final   • Lymphocytes, Absolute 08/04/2022 1.57  0.70 - 3.10 10*3/mm3 Final   • Monocytes, Absolute 08/04/2022 0.30  0.10 - 0.90 10*3/mm3 Final   • Eosinophils, Absolute 08/04/2022 0.03  0.00 - 0.40 10*3/mm3 Final   • Basophils, Absolute 08/04/2022 0.05  0.00 - 0.20 10*3/mm3 Final   • Immature Grans, Absolute 08/04/2022 0.01  0.00 - 0.05 10*3/mm3 Final   • nRBC 08/04/2022 0.0  0.0 - 0.2 /100 WBC Final   • Extra Tube 08/04/2022 Hold for add-ons.   Final    Auto resulted.   • Extra Tube 08/04/2022 hold for add-on   Final    Auto resulted   • Extra Tube 08/04/2022 Hold for add-ons.   Final    Auto resulted.   • Extra Tube 08/04/2022 Hold for add-ons.   Final    Auto resulted   • RBC, UA 08/04/2022 0-2  None Seen, 0-2 /HPF Final   • WBC, UA 08/04/2022 3-5 (A) None Seen, 0-2 /HPF Final   • Bacteria, UA 08/04/2022 None Seen  None Seen /HPF Final   • Squamous Epithelial Cells, UA 08/04/2022 0-2  None Seen, 0-2 /HPF Final   • Hyaline Casts, UA 08/04/2022 None Seen  None Seen /LPF Final   • Methodology 08/04/2022 Automated Microscopy   Final   Admission on 07/29/2022, Discharged on 08/02/2022   Component Date Value Ref Range Status   • QT Interval 07/29/2022 362  ms Final   •  QTC Interval 07/29/2022 442  ms Final   Admission on 07/29/2022, Discharged on 07/29/2022   Component Date Value Ref Range Status   • Glucose 07/29/2022 116 (A) 65 - 99 mg/dL Final   • BUN 07/29/2022 6  6 - 20 mg/dL Final   • Creatinine 07/29/2022 0.90  0.57 - 1.00 mg/dL Final   • Sodium 07/29/2022 143  136 - 145 mmol/L Final   • Potassium 07/29/2022 3.6  3.5 - 5.2 mmol/L Final   • Chloride 07/29/2022 106  98 - 107 mmol/L Final   • CO2 07/29/2022 22.2  22.0 - 29.0 mmol/L Final   • Calcium 07/29/2022 10.0  8.6 - 10.5 mg/dL Final   • Total Protein 07/29/2022 7.3  6.0 - 8.5 g/dL Final   • Albumin 07/29/2022 5.11  3.50 - 5.20 g/dL Final   • ALT (SGPT) 07/29/2022 19  1 - 33 U/L Final   • AST (SGOT) 07/29/2022 12  1 - 32 U/L Final   • Alkaline Phosphatase 07/29/2022 74  39 - 117 U/L Final   • Total Bilirubin 07/29/2022 0.4  0.0 - 1.2 mg/dL Final   • Globulin 07/29/2022 2.2  gm/dL Final   • A/G Ratio 07/29/2022 2.3  g/dL Final   • BUN/Creatinine Ratio 07/29/2022 6.7 (A) 7.0 - 25.0 Final   • Anion Gap 07/29/2022 14.8  5.0 - 15.0 mmol/L Final   • eGFR 07/29/2022 91.7  >60.0 mL/min/1.73 Final    National Kidney Foundation and American Society of Nephrology (ASN) Task Force recommended calculation based on the Chronic Kidney Disease Epidemiology Collaboration (CKD-EPI) equation refit without adjustment for race.   • HCG, Urine QL 07/29/2022 Negative  Negative Final   • Color, UA 07/29/2022 Dark Yellow (A) Yellow, Straw Final   • Appearance, UA 07/29/2022 Cloudy (A) Clear Final   • pH, UA 07/29/2022 6.0  5.0 - 8.0 Final   • Specific Gravity, UA 07/29/2022 1.023  1.005 - 1.030 Final   • Glucose, UA 07/29/2022 Negative  Negative Final   • Ketones, UA 07/29/2022 Trace (A) Negative Final   • Bilirubin, UA 07/29/2022 Negative  Negative Final   • Blood, UA 07/29/2022 Large (3+) (A) Negative Final   • Protein, UA 07/29/2022 Trace (A) Negative Final   • Leuk Esterase, UA 07/29/2022 Small (1+) (A) Negative Final   • Nitrite, UA  07/29/2022 Negative  Negative Final   • Urobilinogen, UA 07/29/2022 1.0 E.U./dL  0.2 - 1.0 E.U./dL Final   • Ethanol 07/29/2022 <10  0 - 10 mg/dL Final   • Ethanol % 07/29/2022 <0.010  % Final   • THC, Screen, Urine 07/29/2022 Negative  Negative Final   • Phencyclidine (PCP), Urine 07/29/2022 Negative  Negative Final   • Cocaine Screen, Urine 07/29/2022 Negative  Negative Final   • Methamphetamine, Ur 07/29/2022 Negative  Negative Final   • Opiate Screen 07/29/2022 Negative  Negative Final   • Amphetamine Screen, Urine 07/29/2022 Negative  Negative Final   • Benzodiazepine Screen, Urine 07/29/2022 Positive (A) Negative Final   • Tricyclic Antidepressants Screen 07/29/2022 Negative  Negative Final   • Methadone Screen, Urine 07/29/2022 Negative  Negative Final   • Barbiturates Screen, Urine 07/29/2022 Negative  Negative Final   • Oxycodone Screen, Urine 07/29/2022 Negative  Negative Final   • Propoxyphene Screen 07/29/2022 Negative  Negative Final   • Buprenorphine, Screen, Urine 07/29/2022 Negative  Negative Final   • Magnesium 07/29/2022 2.1  1.6 - 2.6 mg/dL Final   • COVID19 07/29/2022 Not Detected  Not Detected - Ref. Range Final   • Influenza A PCR 07/29/2022 Not Detected  Not Detected Final   • Influenza B PCR 07/29/2022 Not Detected  Not Detected Final   • WBC 07/29/2022 8.59  3.40 - 10.80 10*3/mm3 Final   • RBC 07/29/2022 4.74  3.77 - 5.28 10*6/mm3 Final   • Hemoglobin 07/29/2022 13.6  12.0 - 15.9 g/dL Final   • Hematocrit 07/29/2022 40.8  34.0 - 46.6 % Final   • MCV 07/29/2022 86.1  79.0 - 97.0 fL Final   • MCH 07/29/2022 28.7  26.6 - 33.0 pg Final   • MCHC 07/29/2022 33.3  31.5 - 35.7 g/dL Final   • RDW 07/29/2022 13.7  12.3 - 15.4 % Final   • RDW-SD 07/29/2022 43.2  37.0 - 54.0 fl Final   • MPV 07/29/2022 9.7  6.0 - 12.0 fL Final   • Platelets 07/29/2022 255  140 - 450 10*3/mm3 Final   • Neutrophil % 07/29/2022 74.2  42.7 - 76.0 % Final   • Lymphocyte % 07/29/2022 20.5  19.6 - 45.3 % Final   • Monocyte %  07/29/2022 3.4 (A) 5.0 - 12.0 % Final   • Eosinophil % 07/29/2022 0.9  0.3 - 6.2 % Final   • Basophil % 07/29/2022 0.7  0.0 - 1.5 % Final   • Immature Grans % 07/29/2022 0.3  0.0 - 0.5 % Final   • Neutrophils, Absolute 07/29/2022 6.37  1.70 - 7.00 10*3/mm3 Final   • Lymphocytes, Absolute 07/29/2022 1.76  0.70 - 3.10 10*3/mm3 Final   • Monocytes, Absolute 07/29/2022 0.29  0.10 - 0.90 10*3/mm3 Final   • Eosinophils, Absolute 07/29/2022 0.08  0.00 - 0.40 10*3/mm3 Final   • Basophils, Absolute 07/29/2022 0.06  0.00 - 0.20 10*3/mm3 Final   • Immature Grans, Absolute 07/29/2022 0.03  0.00 - 0.05 10*3/mm3 Final   • nRBC 07/29/2022 0.0  0.0 - 0.2 /100 WBC Final   • Extra Tube 07/29/2022 Hold for add-ons.   Final    Auto resulted.   • Extra Tube 07/29/2022 hold for add-on   Final    Auto resulted   • Extra Tube 07/29/2022 Hold for add-ons.   Final    Auto resulted.   • Extra Tube 07/29/2022 Hold for add-ons.   Final    Auto resulted   • RBC, UA 07/29/2022 Too Numerous to Count (A) None Seen, 0-2 /HPF Final   • WBC, UA 07/29/2022 13-20 (A) None Seen, 0-2 /HPF Final   • Bacteria, UA 07/29/2022 1+ (A) None Seen /HPF Final   • Squamous Epithelial Cells, UA 07/29/2022 13-20 (A) None Seen, 0-2 /HPF Final   • Hyaline Casts, UA 07/29/2022 None Seen  None Seen /LPF Final   • Methodology 07/29/2022 Automated Microscopy   Final        Condition on Discharge:  improved    Vital Signs  Temp:  [97.1 °F (36.2 °C)-97.3 °F (36.3 °C)] 97.1 °F (36.2 °C)  Heart Rate:  [86-96] 86  Resp:  [18] 18  BP: (110-129)/(67-81) 128/78      Discharge Disposition:  Home or Self Care    Discharge Medications:     Discharge Medications      New Medications      Instructions Start Date   hydrOXYzine 25 MG tablet  Commonly known as: ATARAX   25 mg, Oral, Every 8 Hours PRN      OLANZapine 5 MG tablet  Commonly known as: zyPREXA   5 mg, Oral, Nightly      propranolol 10 MG tablet  Commonly known as: INDERAL   10 mg, Oral, Every 8 Hours PRN      traZODone 50 MG  tablet  Commonly known as: DESYREL   50 mg, Oral, Nightly PRN         Continue These Medications      Instructions Start Date   cetirizine 10 MG tablet  Commonly known as: zyrTEC   10 mg, Oral, Daily         Stop These Medications    escitalopram 10 MG tablet  Commonly known as: LEXAPRO     risperiDONE 1 MG tablet  Commonly known as: risperDAL            Discharge Diet: Regular      Activity at Discharge: As tolerated     Follow-up Appointments     [facility] [id number] [phone]     August 11 2022 at 9:00am with Ruma.        Time spent in discharge: < 30 min    Clinician:   Sienna Paula MD  08/09/22  08:53 EDT

## 2022-08-09 NOTE — PLAN OF CARE
Goal Outcome Evaluation:  Plan of Care Reviewed With: patient  Patient Agreement with Plan of Care: agrees     Progress: improving  Outcome Evaluation: Pt was calm and cooperative with no issues or concerns.

## 2022-08-10 NOTE — PROGRESS NOTES
Behavioral Health Discharge Summary             Please fax within 24 hours of discharge to [facility] at: [phone]      Member Name: Tori Arizmendi Member ID: [id number]   Authorization Number: [id number] Phone: [phone]   Member Address: [address] [place] [id number]   Discharge Date: 08/09/2022 Level of Care at Discharge:    Facility: [facility] Staff Completing Form: ANDREAS ARAIZA RN U.R.   If the member is being discharged directly to a residential or extended care program, please specify the type below.   __Private Child-Caring Facility (PCC) Residential/Group Home   __Private Child-Caring Facility (PCC) Therapeutic Foster Care   __Residential Treatment Facility (RTF)   __Psychiatric Residential Treatment Facility (PRTF I or II)   __Long-Term Acute Inpatient Hospital Services or Extended Care Unit (ECU)   __Other (please specify):    Brief discharge summary of treatment received (for follow up by the case management team): D/C clinical with list of medications and follow up appts given to patient upon discharge.     BRIEF SUMMARY OF RECOMMENDATIONS FOR ONGOING TREATMENT     Discharged to where: HOME   Discharge diagnoses: F 32.9   Axis I:    Axis II:    Axis III:    Axis IV:    Axis V:    Does the member understand his/her DX?  Yes          Medication     Dose     Schedule Supply/  Quantity  Given at Discharge RX Provided  Yes/No  If Rx Provided, Quantity RX Prior Auth Required  Yes/No Prior Auth  Completed   ATARAX  25 MG  EVERY 8 HOURS PRN         ZYPREXA 5 MG  HS         INDERAL  10 MG  EVERY 8 HOURS PRN         TRAZODONE  50 MG  HS PRN                                                          Does the member understand the reason for taking these medications? Yes                                                           FOLLOW-UP APPOINTMENTS   Please schedule within 7 days of discharge and provide appointment details for all referred services.     PCP/Other Providers Involved in Treatment:    Appointment Type: OP   Provider Name: [facility]  Provider Phone:   [phone] Appointment Date: 08/11/2022 Appointment Time: 9:00 AM WITH JAIRO Monge   (new to OP services)        Case Management    Is the member already enrolled in case management?  Yes/No  If yes, date the CM was notified:    If no, was the CM referral offered?  Yes/No  Accepted? Yes/No    Is the Release of Information in the chart? Yes/No:      Medication Management (for member discharged with psychiatric medications):      A&D Treatment (for member with substance abuse/   dependence in the past year):      Medical Condition (for member with a medical condition):    Other recommended treatment:    Do you have any concerns about the discharge plan?  No    If yes, explain:    Was the member involved in the discharge planning?  Yes    If no, explain:    Was a copy of the discharge plan provided to the member?  Yes    If no, explain:

## 2022-11-14 ENCOUNTER — TELEMEDICINE (OUTPATIENT)
Dept: PSYCHIATRY | Facility: CLINIC | Age: 22
End: 2022-11-14

## 2022-11-14 DIAGNOSIS — F41.1 GENERALIZED ANXIETY DISORDER: ICD-10-CM

## 2022-11-14 DIAGNOSIS — F33.1 MAJOR DEPRESSIVE DISORDER, RECURRENT EPISODE, MODERATE: Primary | ICD-10-CM

## 2022-11-14 PROCEDURE — 99214 OFFICE O/P EST MOD 30 MIN: CPT | Performed by: PSYCHIATRY & NEUROLOGY
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
